# Patient Record
Sex: FEMALE | Race: BLACK OR AFRICAN AMERICAN | NOT HISPANIC OR LATINO | URBAN - METROPOLITAN AREA
[De-identification: names, ages, dates, MRNs, and addresses within clinical notes are randomized per-mention and may not be internally consistent; named-entity substitution may affect disease eponyms.]

---

## 2022-08-11 ENCOUNTER — HOSPITAL ENCOUNTER (INPATIENT)
Facility: HOSPITAL | Age: 57
LOS: 3 days | Discharge: HOME | DRG: 761 | End: 2022-08-15
Attending: OBSTETRICS & GYNECOLOGY | Admitting: OBSTETRICS & GYNECOLOGY
Payer: COMMERCIAL

## 2022-08-11 DIAGNOSIS — N95.0 POST-MENOPAUSAL BLEEDING: Primary | ICD-10-CM

## 2022-08-11 DIAGNOSIS — D21.9 FIBROIDS: ICD-10-CM

## 2022-08-11 DIAGNOSIS — Z01.811 PRE-OP CHEST EXAM: ICD-10-CM

## 2022-08-11 PROBLEM — R50.9 FEVER: Status: ACTIVE | Noted: 2022-08-11

## 2022-08-11 LAB
ALBUMIN SERPL BCP-MCNC: 2.9 G/DL (ref 3.5–5.2)
ALP SERPL-CCNC: 61 U/L (ref 55–135)
ALT SERPL W/O P-5'-P-CCNC: 99 U/L (ref 10–44)
ANION GAP SERPL CALC-SCNC: 9 MMOL/L (ref 8–16)
AST SERPL-CCNC: 94 U/L (ref 10–40)
BACTERIA #/AREA URNS AUTO: ABNORMAL /HPF
BASOPHILS # BLD AUTO: 0.04 K/UL (ref 0–0.2)
BASOPHILS # BLD AUTO: 0.05 K/UL (ref 0–0.2)
BASOPHILS NFR BLD: 0.5 % (ref 0–1.9)
BASOPHILS NFR BLD: 0.7 % (ref 0–1.9)
BILIRUB SERPL-MCNC: 1.1 MG/DL (ref 0.1–1)
BILIRUB UR QL STRIP: NEGATIVE
BUN SERPL-MCNC: 6 MG/DL (ref 6–20)
CALCIUM SERPL-MCNC: 9.2 MG/DL (ref 8.7–10.5)
CANCER AG125 SERPL-ACNC: 117 U/ML (ref 0–30)
CHLORIDE SERPL-SCNC: 105 MMOL/L (ref 95–110)
CLARITY UR REFRACT.AUTO: ABNORMAL
CO2 SERPL-SCNC: 23 MMOL/L (ref 23–29)
COLOR UR AUTO: YELLOW
CREAT SERPL-MCNC: 0.8 MG/DL (ref 0.5–1.4)
DIFFERENTIAL METHOD: ABNORMAL
DIFFERENTIAL METHOD: ABNORMAL
EOSINOPHIL # BLD AUTO: 0.2 K/UL (ref 0–0.5)
EOSINOPHIL # BLD AUTO: 0.3 K/UL (ref 0–0.5)
EOSINOPHIL NFR BLD: 2.3 % (ref 0–8)
EOSINOPHIL NFR BLD: 3.6 % (ref 0–8)
ERYTHROCYTE [DISTWIDTH] IN BLOOD BY AUTOMATED COUNT: 13.6 % (ref 11.5–14.5)
ERYTHROCYTE [DISTWIDTH] IN BLOOD BY AUTOMATED COUNT: 13.8 % (ref 11.5–14.5)
EST. GFR  (NO RACE VARIABLE): >60 ML/MIN/1.73 M^2
GLUCOSE SERPL-MCNC: 95 MG/DL (ref 70–110)
GLUCOSE UR QL STRIP: NEGATIVE
HCT VFR BLD AUTO: 21.4 % (ref 37–48.5)
HCT VFR BLD AUTO: 21.9 % (ref 37–48.5)
HGB BLD-MCNC: 7.1 G/DL (ref 12–16)
HGB BLD-MCNC: 7.2 G/DL (ref 12–16)
HGB UR QL STRIP: ABNORMAL
HYALINE CASTS UR QL AUTO: 0 /LPF
IMM GRANULOCYTES # BLD AUTO: 0.03 K/UL (ref 0–0.04)
IMM GRANULOCYTES # BLD AUTO: 0.07 K/UL (ref 0–0.04)
IMM GRANULOCYTES NFR BLD AUTO: 0.4 % (ref 0–0.5)
IMM GRANULOCYTES NFR BLD AUTO: 1 % (ref 0–0.5)
KETONES UR QL STRIP: ABNORMAL
LEUKOCYTE ESTERASE UR QL STRIP: NEGATIVE
LYMPHOCYTES # BLD AUTO: 0.5 K/UL (ref 1–4.8)
LYMPHOCYTES # BLD AUTO: 0.6 K/UL (ref 1–4.8)
LYMPHOCYTES NFR BLD: 6.6 % (ref 18–48)
LYMPHOCYTES NFR BLD: 7.1 % (ref 18–48)
MCH RBC QN AUTO: 25.9 PG (ref 27–31)
MCH RBC QN AUTO: 26.5 PG (ref 27–31)
MCHC RBC AUTO-ENTMCNC: 32.9 G/DL (ref 32–36)
MCHC RBC AUTO-ENTMCNC: 33.2 G/DL (ref 32–36)
MCV RBC AUTO: 79 FL (ref 82–98)
MCV RBC AUTO: 80 FL (ref 82–98)
MICROSCOPIC COMMENT: ABNORMAL
MONOCYTES # BLD AUTO: 0.5 K/UL (ref 0.3–1)
MONOCYTES # BLD AUTO: 0.6 K/UL (ref 0.3–1)
MONOCYTES NFR BLD: 6.5 % (ref 4–15)
MONOCYTES NFR BLD: 7.3 % (ref 4–15)
NEUTROPHILS # BLD AUTO: 5.9 K/UL (ref 1.8–7.7)
NEUTROPHILS # BLD AUTO: 6.3 K/UL (ref 1.8–7.7)
NEUTROPHILS NFR BLD: 81.6 % (ref 38–73)
NEUTROPHILS NFR BLD: 82.4 % (ref 38–73)
NITRITE UR QL STRIP: NEGATIVE
NRBC BLD-RTO: 0 /100 WBC
NRBC BLD-RTO: 0 /100 WBC
PH UR STRIP: 7 [PH] (ref 5–8)
PLATELET # BLD AUTO: 289 K/UL (ref 150–450)
PLATELET # BLD AUTO: 308 K/UL (ref 150–450)
PMV BLD AUTO: 9.6 FL (ref 9.2–12.9)
PMV BLD AUTO: 9.9 FL (ref 9.2–12.9)
POTASSIUM SERPL-SCNC: 3.9 MMOL/L (ref 3.5–5.1)
PROT SERPL-MCNC: 6.7 G/DL (ref 6–8.4)
PROT UR QL STRIP: ABNORMAL
RBC # BLD AUTO: 2.68 M/UL (ref 4–5.4)
RBC # BLD AUTO: 2.78 M/UL (ref 4–5.4)
RBC #/AREA URNS AUTO: >100 /HPF (ref 0–4)
SODIUM SERPL-SCNC: 137 MMOL/L (ref 136–145)
SP GR UR STRIP: 1.02 (ref 1–1.03)
SQUAMOUS #/AREA URNS AUTO: 3 /HPF
URN SPEC COLLECT METH UR: ABNORMAL
WBC # BLD AUTO: 7.26 K/UL (ref 3.9–12.7)
WBC # BLD AUTO: 7.7 K/UL (ref 3.9–12.7)
WBC #/AREA URNS AUTO: 1 /HPF (ref 0–5)

## 2022-08-11 PROCEDURE — G0379 DIRECT REFER HOSPITAL OBSERV: HCPCS

## 2022-08-11 PROCEDURE — 96375 TX/PRO/DX INJ NEW DRUG ADDON: CPT

## 2022-08-11 PROCEDURE — 36415 COLL VENOUS BLD VENIPUNCTURE: CPT | Performed by: OBSTETRICS & GYNECOLOGY

## 2022-08-11 PROCEDURE — 99219 PR INITIAL OBSERVATION CARE,LEVL II: ICD-10-PCS | Mod: ,,, | Performed by: OBSTETRICS & GYNECOLOGY

## 2022-08-11 PROCEDURE — 87086 URINE CULTURE/COLONY COUNT: CPT | Performed by: STUDENT IN AN ORGANIZED HEALTH CARE EDUCATION/TRAINING PROGRAM

## 2022-08-11 PROCEDURE — 85025 COMPLETE CBC W/AUTO DIFF WBC: CPT | Performed by: OBSTETRICS & GYNECOLOGY

## 2022-08-11 PROCEDURE — 63600175 PHARM REV CODE 636 W HCPCS: Performed by: STUDENT IN AN ORGANIZED HEALTH CARE EDUCATION/TRAINING PROGRAM

## 2022-08-11 PROCEDURE — 96376 TX/PRO/DX INJ SAME DRUG ADON: CPT

## 2022-08-11 PROCEDURE — 86304 IMMUNOASSAY TUMOR CA 125: CPT | Performed by: STUDENT IN AN ORGANIZED HEALTH CARE EDUCATION/TRAINING PROGRAM

## 2022-08-11 PROCEDURE — 87040 BLOOD CULTURE FOR BACTERIA: CPT | Performed by: STUDENT IN AN ORGANIZED HEALTH CARE EDUCATION/TRAINING PROGRAM

## 2022-08-11 PROCEDURE — 99219 PR INITIAL OBSERVATION CARE,LEVL II: CPT | Mod: ,,, | Performed by: OBSTETRICS & GYNECOLOGY

## 2022-08-11 PROCEDURE — 85025 COMPLETE CBC W/AUTO DIFF WBC: CPT | Mod: 91 | Performed by: STUDENT IN AN ORGANIZED HEALTH CARE EDUCATION/TRAINING PROGRAM

## 2022-08-11 PROCEDURE — 25000003 PHARM REV CODE 250: Performed by: STUDENT IN AN ORGANIZED HEALTH CARE EDUCATION/TRAINING PROGRAM

## 2022-08-11 PROCEDURE — 81001 URINALYSIS AUTO W/SCOPE: CPT | Performed by: STUDENT IN AN ORGANIZED HEALTH CARE EDUCATION/TRAINING PROGRAM

## 2022-08-11 PROCEDURE — 36415 COLL VENOUS BLD VENIPUNCTURE: CPT | Performed by: STUDENT IN AN ORGANIZED HEALTH CARE EDUCATION/TRAINING PROGRAM

## 2022-08-11 PROCEDURE — 96361 HYDRATE IV INFUSION ADD-ON: CPT

## 2022-08-11 PROCEDURE — G0378 HOSPITAL OBSERVATION PER HR: HCPCS

## 2022-08-11 PROCEDURE — 80053 COMPREHEN METABOLIC PANEL: CPT | Performed by: OBSTETRICS & GYNECOLOGY

## 2022-08-11 RX ORDER — LANOLIN ALCOHOL/MO/W.PET/CERES
1 CREAM (GRAM) TOPICAL DAILY
Status: DISCONTINUED | OUTPATIENT
Start: 2022-08-11 | End: 2022-08-15 | Stop reason: HOSPADM

## 2022-08-11 RX ORDER — METOCLOPRAMIDE HYDROCHLORIDE 5 MG/ML
10 INJECTION INTRAMUSCULAR; INTRAVENOUS EVERY 6 HOURS PRN
Status: DISCONTINUED | OUTPATIENT
Start: 2022-08-11 | End: 2022-08-15 | Stop reason: HOSPADM

## 2022-08-11 RX ORDER — IBUPROFEN 600 MG/1
600 TABLET ORAL EVERY 6 HOURS
Status: DISCONTINUED | OUTPATIENT
Start: 2022-08-11 | End: 2022-08-11

## 2022-08-11 RX ORDER — KETOROLAC TROMETHAMINE 15 MG/ML
30 INJECTION, SOLUTION INTRAMUSCULAR; INTRAVENOUS EVERY 6 HOURS
Status: DISCONTINUED | OUTPATIENT
Start: 2022-08-11 | End: 2022-08-13

## 2022-08-11 RX ORDER — DIPHENHYDRAMINE HCL 25 MG
25 CAPSULE ORAL EVERY 6 HOURS PRN
Status: DISCONTINUED | OUTPATIENT
Start: 2022-08-11 | End: 2022-08-15 | Stop reason: HOSPADM

## 2022-08-11 RX ORDER — SODIUM CHLORIDE, SODIUM LACTATE, POTASSIUM CHLORIDE, CALCIUM CHLORIDE 600; 310; 30; 20 MG/100ML; MG/100ML; MG/100ML; MG/100ML
INJECTION, SOLUTION INTRAVENOUS CONTINUOUS
Status: DISCONTINUED | OUTPATIENT
Start: 2022-08-11 | End: 2022-08-11

## 2022-08-11 RX ORDER — ONDANSETRON 2 MG/ML
4 INJECTION INTRAMUSCULAR; INTRAVENOUS EVERY 6 HOURS PRN
Status: DISCONTINUED | OUTPATIENT
Start: 2022-08-11 | End: 2022-08-15 | Stop reason: HOSPADM

## 2022-08-11 RX ORDER — SIMETHICONE 80 MG
1 TABLET,CHEWABLE ORAL 3 TIMES DAILY PRN
Status: DISCONTINUED | OUTPATIENT
Start: 2022-08-11 | End: 2022-08-15 | Stop reason: HOSPADM

## 2022-08-11 RX ORDER — POLYETHYLENE GLYCOL 3350 17 G/17G
17 POWDER, FOR SOLUTION ORAL 2 TIMES DAILY PRN
Status: DISCONTINUED | OUTPATIENT
Start: 2022-08-11 | End: 2022-08-15 | Stop reason: HOSPADM

## 2022-08-11 RX ORDER — OXYCODONE HYDROCHLORIDE 10 MG/1
10 TABLET ORAL EVERY 6 HOURS PRN
Status: DISCONTINUED | OUTPATIENT
Start: 2022-08-11 | End: 2022-08-15 | Stop reason: HOSPADM

## 2022-08-11 RX ORDER — MEDROXYPROGESTERONE ACETATE 10 MG/1
10 TABLET ORAL DAILY
Status: DISCONTINUED | OUTPATIENT
Start: 2022-08-11 | End: 2022-08-15 | Stop reason: HOSPADM

## 2022-08-11 RX ORDER — HYDRALAZINE HYDROCHLORIDE 20 MG/ML
10 INJECTION INTRAMUSCULAR; INTRAVENOUS EVERY 6 HOURS PRN
Status: DISCONTINUED | OUTPATIENT
Start: 2022-08-11 | End: 2022-08-15 | Stop reason: HOSPADM

## 2022-08-11 RX ORDER — OXYCODONE HYDROCHLORIDE 5 MG/1
5 TABLET ORAL EVERY 6 HOURS PRN
Status: DISCONTINUED | OUTPATIENT
Start: 2022-08-11 | End: 2022-08-15 | Stop reason: HOSPADM

## 2022-08-11 RX ORDER — AMOXICILLIN 250 MG
1 CAPSULE ORAL 2 TIMES DAILY
Status: DISCONTINUED | OUTPATIENT
Start: 2022-08-11 | End: 2022-08-15 | Stop reason: HOSPADM

## 2022-08-11 RX ORDER — ACETAMINOPHEN 500 MG
1000 TABLET ORAL EVERY 6 HOURS PRN
Status: DISCONTINUED | OUTPATIENT
Start: 2022-08-11 | End: 2022-08-13

## 2022-08-11 RX ORDER — CALCIUM CARBONATE 200(500)MG
500 TABLET,CHEWABLE ORAL 2 TIMES DAILY PRN
Status: DISCONTINUED | OUTPATIENT
Start: 2022-08-11 | End: 2022-08-15 | Stop reason: HOSPADM

## 2022-08-11 RX ORDER — TALC
6 POWDER (GRAM) TOPICAL NIGHTLY PRN
Status: DISCONTINUED | OUTPATIENT
Start: 2022-08-11 | End: 2022-08-15 | Stop reason: HOSPADM

## 2022-08-11 RX ADMIN — FERROUS SULFATE TAB 325 MG (65 MG ELEMENTAL FE) 1 EACH: 325 (65 FE) TAB at 02:08

## 2022-08-11 RX ADMIN — KETOROLAC TROMETHAMINE 30 MG: 15 INJECTION, SOLUTION INTRAMUSCULAR; INTRAVENOUS at 11:08

## 2022-08-11 RX ADMIN — CEFTRIAXONE 1 G: 1 INJECTION, SOLUTION INTRAVENOUS at 09:08

## 2022-08-11 RX ADMIN — KETOROLAC TROMETHAMINE 30 MG: 15 INJECTION, SOLUTION INTRAMUSCULAR; INTRAVENOUS at 05:08

## 2022-08-11 RX ADMIN — SODIUM CHLORIDE, SODIUM LACTATE, POTASSIUM CHLORIDE, AND CALCIUM CHLORIDE: .6; .31; .03; .02 INJECTION, SOLUTION INTRAVENOUS at 10:08

## 2022-08-11 RX ADMIN — MEDROXYPROGESTERONE ACETATE 10 MG: 10 TABLET ORAL at 02:08

## 2022-08-11 RX ADMIN — ACETAMINOPHEN 1000 MG: 500 TABLET ORAL at 08:08

## 2022-08-11 RX ADMIN — ACETAMINOPHEN 1000 MG: 500 TABLET ORAL at 09:08

## 2022-08-11 RX ADMIN — SENNOSIDES AND DOCUSATE SODIUM 1 TABLET: 50; 8.6 TABLET ORAL at 08:08

## 2022-08-11 NOTE — ASSESSMENT & PLAN NOTE
- Isolated 101.4 temperature with remainder VSS. 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

## 2022-08-11 NOTE — HOSPITAL COURSE
08/11/2022 Transfer from OSH in the setting of vaginal bleeding, pelvic pain, and fevers. On arrival, patient febrile to 101.4, afebrile s/p administration of Tylenol. IVF started. CBC/CMP obtained. Patient reports light vaginal spotting. Last BM this AM. Reports mild abdominal cramping but denies severe pain. UA negative nitrites/leuks. Blood and urine culture ordered however s/p antibiotics at OSH. Rocephin x1 administered in the setting of fever. CBC/CMP pending. Plan for supportive care with pain control and IV Abx.   08/12/2022 - AM H/H roughly stable @ 6.9/21. However patient now symptomatic so will transfuse. Patient remains asymptomatic. Continued mild range fevers overnight, Rocephin discontinued & Zosyn initiated. Continue supportive care with pain control & IV Abx.  08/13/2022 AM Hgb improved after 1upRBC @ 7.5, asymptomatic > repeat 1u pRBC. Fever overnight 103.4 at end of blood transfusion, resolved 100.1 with Tylenol. No other signs of infection. Continuing Zoysn, pre treating transfusion with Benadryl and Tylenol. CT AP 20cm fibroid. DC Toradol  > ibuprofen. DC Tylenol PRN, nursing to call if any fevers. CXR and EKG for pre op clearance. PM CBC and BMP. Starting prophylactic Lovenox.   08/14/2022 Hgb 9.7 > 8.6, overall stable. Cr at baseline 1.0 > 0.8. Afebrile x 36H, d/c IV Zosyn and transition PO augmentin. D/c ibuprofen. Will monitor closely for fevers. Discussed possible discharge vs surgery, will f/u with Dr Coffey.   08/15/2022 Afebrile x48H. Receiving PO augmentin and oxycodone 5/10 PRN for pain. Patient to be discharged and scheduled for outpatient WEN.

## 2022-08-11 NOTE — PROGRESS NOTES
[facility] - Telemetry Stepdown  Gynecologic Oncology  Progress Note      Patient Name: Priyanka Raines  MRN: [id number]  Admission Date: 2022  Hospital Length of Stay: 1 days  Attending Provider: Melvin Coffey MD  Primary Care Provider: Primary Doctor No  Principal Problem: <principal problem not specified>    Follow-up For: * No surgery found *  Post-Operative Day:    Subjective:      History of Present Illness:  Priyanka Raines is a 57 y.o.  presents as transfer from H in the setting of vaginal bleeding, pelvic pain, and fevers. Patient had been admitted twice in the setting of vaginal bleeding and was thought to be secondary to uterine fibroids. Patient then developed isolated fevers for which blood and urine cultures were obtained, and she was started on antibiotics (unclear which antibiotic therapy was started). Review of outside records reveals normal daily CBC (no WBC), TVUS showed enlarged uterus measuring 15.6 x 6.7 x 10.9 cm with enlarged midline mass measuring 7.3 x 8.5 cm likely pedunculated fibroid or ovarian dermoid, tumor markers drawn, and endometrial biopsy completed with sound to 10 cm, however results unavailable.     On arrival to [facility] [place], [place], patient had isolated fever 101.4, , BP wnl, 94% on RA. She was started on IV Rocephin, fluid bolus and STAT labs ordered.    On evaluation, patient resting comfortably in bed. Able to converse in complete sentences. She is accompanied by her  and son. She reports mild pelvic cramping and light vaginal spotting. She denies nausea/vomiting, constipation, diarrhea, dysuria, CP, or SOB.    Patient reports long standing history of PMB. She previously had AUB with menstrual cycles and is s/p endometrial ablation greater than 15 years ago. She noticed spotting recently for which she underwent endometrial biopsies x2 which were negative for malignancy per patient report. She was started on Provera with control of bleeding. She has known history  of uterine fibroids. Patient reports this past week she had an episode of bleeding and associated pain which prompted evaluation in the ED.      Ob/Gyn history  ,  x1  H/o of endometrial ablation in the setting of AUB approximately 15 yo  Denies history of STDs  Receives routine Ob[facility]Gyn care at [facility]. Last pap smear one year ago and normal per patient report  Currently sexually active with one male partner  Menopause age 54  Cousin with h/o breast cancer, no first degree relatives with history of breast/Gyn cancer          Hospital Course:  2022 Transfer from OSH in the setting of vaginal bleeding, pelvic pain, and fevers. On arrival, patient febrile to 101.4, afebrile s/p administration of Tylenol. IVF started. CBC/CMP obtained. Patient reports light vaginal spotting. Last BM this AM. Reports mild abdominal cramping but denies severe pain. UA negative nitrites/leuks. Blood and urine culture ordered however s/p antibiotics at OSH. Rocephin x1 administered in the setting of fever. CBC/CMP pending. Plan for supportive care with pain control and IV Abx.             Scheduled Meds:   cefTRIAXone (ROCEPHIN) IVPB  1 g Intravenous Q24H    ketorolac  30 mg Intravenous Q6H     Continuous Infusions:   lactated ringers 125 mL/hr at 22 1032     PRN Meds:acetaminophen    Review of patient's allergies indicates:  Not on File    Objective:     Vital Signs (Most Recent):  Temp: 99.9 °F (37.7 °C) (22 1123)  Pulse: 99 (22 1123)  Resp: 18 (22 1123)  BP: 127/63 (22 1123)  SpO2: 96 % (22 1123) Vital Signs (24h Range):  Temp:  [99.4 °F (37.4 °C)-101.4 °F (38.6 °C)] 99.9 °F (37.7 °C)  Pulse:  [] 99  Resp:  [16-20] 18  SpO2:  [94 %-100 %] 96 %  BP: (127-144)/(61-69) 127/63        There is no height or weight on file to calculate BMI.    Intake/Output - Last 3 Shifts          0700  08/10 0659 08/10 0700  08/11 0659 08/11 0700  08/12 0659           Stool Occurrence   0 x                Physical Exam:   Constitutional: She is oriented to person, place, and time. She appears well-developed. No distress.    HENT:   Head: Normocephalic and atraumatic.     Neck: No thyromegaly present.    Cardiovascular:  Normal rate.             Pulmonary/Chest: Effort normal. No respiratory distress. Right breast exhibits no mass, no nipple discharge, no skin change, no tenderness and no swelling. Left breast exhibits no mass, no nipple discharge, no skin change, no tenderness and no swelling. Breasts are symmetrical.        Abdominal: Soft. She exhibits no distension and no mass. There is no splenomegaly or hepatomegaly. There is no abdominal tenderness. There is no rebound and no guarding. No hernia.     Genitourinary: Cervix is normal. Right adnexum displays no tenderness and no fullness. Left adnexum displays no tenderness and no fullness. Uterus is not enlarged and not tender.    Genitourinary Comments: Deferred, no bleeding on pad                Lymphadenopathy:     She has no cervical adenopathy.    Neurological: She is alert and oriented to person, place, and time.    Skin: Skin is warm and dry.    Psychiatric: She has a normal mood and affect. Her behavior is normal. Mood, affect and thought content normal.     Lines/Drains/Airways       Peripheral Intravenous Line  Duration                  Peripheral IV - Single Lumen 08/11/22 0930 22 G Anterior;Distal;Right Forearm <1 day                    Laboratory:  Recent Lab Results         08/11/22  0956        Appearance, UA Hazy       Bacteria, UA Few       Bilirubin (UA) Negative       Color, UA Yellow       Glucose, UA Negative       Hyaline Casts, UA 0       Ketones, UA 3+       Leukocytes, UA Negative       Microscopic Comment SEE COMMENT  Comment: Other formed elements not mentioned in the report are not   present in the microscopic examination.          NITRITE UA Negative       Occult Blood UA 3+       pH, UA 7.0       Protein, UA 1+  Comment:  Recommend a 24 hour urine protein or a urine   protein/creatinine ratio if globulin induced proteinuria is  clinically suspected.         RBC, UA >100       Specific Gravity, UA 1.025       Specimen UA Urine, Clean Catch       Squam Epithel, UA 3       WBC, UA 1                 Assessment/Plan:     Fever  - Isolated 101.4 temperature with remainder VSS. 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, will continue to look for signs/symptoms of infection       Post-menopausal bleeding  - s/p Endometrial ablation circa [id number]  - Patient reports PMB beginning approximately one year ago with negative endometrial biopsies and controlled with provera  - Patient then presented with acute vaginal bleeding with associated pain and fevers and transferred to [facility] [place]  - Outside records reviewed, TVUS enlarged with 15.6 x 6.7 x 10.9 with multiple myomas and large midline mass measuring 7.3 x 7 x 8.5 cm with small cysitic area representing pedunculated fibroid vs dermoid mass  - Tumor markers obtained at OSH but unavailable,  ordered  - Endometrial biopsy performed at OSH, pathology pending  - Bleeding likely secondary to uterine fibroids, however Embx pending  - Continue home Provera 10 mg  - Consider repeat imaging on admission  - Continue scheduled Toradol and Tylenol PRN for pain        VTE Risk Mitigation (From admission, onward)    None            Katherine C Boecking, MD  Gynecologic Oncology  [facility] - Telemetry Stepdown

## 2022-08-11 NOTE — PLAN OF CARE
Problem: Adult Inpatient Plan of Care  Goal: Plan of Care Review  Outcome: Ongoing, Progressing  Goal: Patient-Specific Goal (Individualized)  Outcome: Ongoing, Progressing  Goal: Optimal Comfort and Wellbeing  Outcome: Ongoing, Progressing  Intervention: Provide Person-Centered Care  Flowsheets (Taken 8/11/2022 6652)  Trust Relationship/Rapport:   care explained   choices provided   emotional support provided   questions answered   questions encouraged   reassurance provided   thoughts/feelings acknowledged   empathic listening provided     POC reviewed. AAOX4. Afebrile. Tylenol,prn for fever earlier. Pain manage with Toradol,IV. No acute changes. Patient waiting to be transfer to Oncology awaiting bed. Free from falls and injuries. Call light in reach. [facility]

## 2022-08-11 NOTE — SUBJECTIVE & OBJECTIVE
Scheduled Meds:   cefTRIAXone (ROCEPHIN) IVPB  1 g Intravenous Q24H    ketorolac  30 mg Intravenous Q6H     Continuous Infusions:   lactated ringers 125 mL/hr at 08/11/22 1032     PRN Meds:acetaminophen    Review of patient's allergies indicates:  Not on File    Objective:     Vital Signs (Most Recent):  Temp: 99.9 °F (37.7 °C) (08/11/22 1123)  Pulse: 99 (08/11/22 1123)  Resp: 18 (08/11/22 1123)  BP: 127/63 (08/11/22 1123)  SpO2: 96 % (08/11/22 1123) Vital Signs (24h Range):  Temp:  [99.4 °F (37.4 °C)-101.4 °F (38.6 °C)] 99.9 °F (37.7 °C)  Pulse:  [] 99  Resp:  [16-20] 18  SpO2:  [94 %-100 %] 96 %  BP: (127-144)/(61-69) 127/63        There is no height or weight on file to calculate BMI.    Intake/Output - Last 3 Shifts         08/09 0700  08/10 0659 08/10 0700  08/11 0659 08/11 0700  08/12 0659           Stool Occurrence   0 x               Physical Exam:   Constitutional: She is oriented to person, place, and time. She appears well-developed. No distress.    HENT:   Head: Normocephalic and atraumatic.     Neck: No thyromegaly present.    Cardiovascular:  Normal rate.             Pulmonary/Chest: Effort normal. No respiratory distress. Right breast exhibits no mass, no nipple discharge, no skin change, no tenderness and no swelling. Left breast exhibits no mass, no nipple discharge, no skin change, no tenderness and no swelling. Breasts are symmetrical.        Abdominal: Soft. She exhibits no distension and no mass. There is no splenomegaly or hepatomegaly. There is no abdominal tenderness. There is no rebound and no guarding. No hernia.     Genitourinary: Cervix is normal. Right adnexum displays no tenderness and no fullness. Left adnexum displays no tenderness and no fullness. Uterus is not enlarged and not tender.    Genitourinary Comments: Deferred, no bleeding on pad                Lymphadenopathy:     She has no cervical adenopathy.    Neurological: She is alert and oriented to person, place, and  time.    Skin: Skin is warm and dry.    Psychiatric: She has a normal mood and affect. Her behavior is normal. Mood, affect and thought content normal.     Lines/Drains/Airways       Peripheral Intravenous Line  Duration                  Peripheral IV - Single Lumen 08/11/22 0930 22 G Anterior;Distal;Right Forearm <1 day                    Laboratory:  Recent Lab Results         08/11/22  0956        Appearance, UA Hazy       Bacteria, UA Few       Bilirubin (UA) Negative       Color, UA Yellow       Glucose, UA Negative       Hyaline Casts, UA 0       Ketones, UA 3+       Leukocytes, UA Negative       Microscopic Comment SEE COMMENT  Comment: Other formed elements not mentioned in the report are not   present in the microscopic examination.          NITRITE UA Negative       Occult Blood UA 3+       pH, UA 7.0       Protein, UA 1+  Comment: Recommend a 24 hour urine protein or a urine   protein/creatinine ratio if globulin induced proteinuria is  clinically suspected.         RBC, UA >100       Specific Gravity, UA 1.025       Specimen UA Urine, Clean Catch       Squam Epithel, UA 3       WBC, UA 1

## 2022-08-11 NOTE — ASSESSMENT & PLAN NOTE
- s/p Endometrial ablation circa [id number]  - Patient reports PMB beginning approximately one year ago with negative endometrial biopsies and controlled with provera  - Patient then presented with acute vaginal bleeding with associated pain and fevers and transferred to [facility] [place]  - Outside records reviewed, TVUS enlarged with 15.6 x 6.7 x 10.9 with multiple myomas and large midline mass measuring 7.3 x 7 x 8.5 cm with small cysitic area representing pedunculated fibroid vs dermoid mass  - Tumor markers obtained at OSH but unavailable,  ordered  - Endometrial biopsy performed at OSH, pathology pending  - Bleeding likely secondary to uterine fibroids, however Embx pending  - Continue home Provera 10 mg  - Consider repeat imaging on admission  - Continue scheduled Toradol and Tylenol PRN for pain

## 2022-08-11 NOTE — NURSING
Received pt via stretcher, AAOX4. See flow sheet.  Patient oriented to room. Call light in reach. Bed in lowest position. Patient complaint of headache. Denies abdominal discomfort@this time.

## 2022-08-11 NOTE — HPI
Priyanka Raines is a 57 y.o.  presents as transfer from OSH in the setting of vaginal bleeding, pelvic pain, and fevers. Patient had been admitted twice in the setting of vaginal bleeding and was thought to be secondary to uterine fibroids. Patient then developed isolated fevers for which blood and urine cultures were obtained, and she was started on antibiotics (unclear which antibiotic therapy was started). Review of outside records reveals normal daily CBC (no WBC), TVUS showed enlarged uterus measuring 15.6 x 6.7 x 10.9 cm with enlarged midline mass measuring 7.3 x 8.5 cm likely pedunculated fibroid or ovarian dermoid, tumor markers drawn, and endometrial biopsy completed with sound to 10 cm, however results unavailable.     On arrival to [facility] [place], patient had isolated fever 101.4, , BP wnl, 94% on RA. She was started on IV Rocephin, fluid bolus and STAT labs ordered.    On evaluation, patient resting comfortably in bed. Able to converse in complete sentences. She is accompanied by her  and son. She reports mild pelvic cramping and light vaginal spotting. She denies nausea/vomiting, constipation, diarrhea, dysuria, CP, or SOB.    Patient reports long standing history of PMB. She previously had AUB with menstrual cycles and is s/p endometrial ablation greater than 15 years ago. She noticed spotting recently for which she underwent endometrial biopsies x2 which were negative for malignancy per patient report. She was started on Provera with control of bleeding. She has known history of uterine fibroids. Patient reports this past week she had an episode of bleeding and associated pain which prompted evaluation in the ED.      Ob/Gyn history  ,  x1  H/o of endometrial ablation in the setting of AUB approximately 15 yo  Denies history of STDs  Receives routine Ob/Gyn care at [facility]. Last pap smear one year ago and normal per patient report  Currently sexually active with one male  partner  Menopause age 54  Cousin with h/o breast cancer, no first degree relatives with history of breast/Gyn cancer

## 2022-08-12 PROBLEM — R74.01 TRANSAMINITIS: Status: ACTIVE | Noted: 2022-08-12

## 2022-08-12 PROBLEM — D21.9 FIBROIDS: Status: ACTIVE | Noted: 2022-08-12

## 2022-08-12 LAB
ABO + RH BLD: NORMAL
BACTERIA UR CULT: NO GROWTH
BASOPHILS # BLD AUTO: 0.03 K/UL (ref 0–0.2)
BASOPHILS NFR BLD: 0.5 % (ref 0–1.9)
BLD GP AB SCN CELLS X3 SERPL QL: NORMAL
BLD PROD TYP BPU: NORMAL
BLOOD UNIT EXPIRATION DATE: NORMAL
BLOOD UNIT TYPE CODE: 5100
BLOOD UNIT TYPE: NORMAL
CODING SYSTEM: NORMAL
DIFFERENTIAL METHOD: ABNORMAL
DISPENSE STATUS: NORMAL
EOSINOPHIL # BLD AUTO: 0.3 K/UL (ref 0–0.5)
EOSINOPHIL NFR BLD: 5.1 % (ref 0–8)
ERYTHROCYTE [DISTWIDTH] IN BLOOD BY AUTOMATED COUNT: 13.8 % (ref 11.5–14.5)
HCT VFR BLD AUTO: 21 % (ref 37–48.5)
HGB BLD-MCNC: 6.9 G/DL (ref 12–16)
IMM GRANULOCYTES # BLD AUTO: 0.04 K/UL (ref 0–0.04)
IMM GRANULOCYTES NFR BLD AUTO: 0.7 % (ref 0–0.5)
LYMPHOCYTES # BLD AUTO: 0.4 K/UL (ref 1–4.8)
LYMPHOCYTES NFR BLD: 7 % (ref 18–48)
MCH RBC QN AUTO: 26.1 PG (ref 27–31)
MCHC RBC AUTO-ENTMCNC: 32.9 G/DL (ref 32–36)
MCV RBC AUTO: 80 FL (ref 82–98)
MONOCYTES # BLD AUTO: 0.5 K/UL (ref 0.3–1)
MONOCYTES NFR BLD: 8.6 % (ref 4–15)
NEUTROPHILS # BLD AUTO: 4.5 K/UL (ref 1.8–7.7)
NEUTROPHILS NFR BLD: 78.1 % (ref 38–73)
NRBC BLD-RTO: 0 /100 WBC
NUM UNITS TRANS PACKED RBC: NORMAL
PLATELET # BLD AUTO: 293 K/UL (ref 150–450)
PMV BLD AUTO: 9.7 FL (ref 9.2–12.9)
RBC # BLD AUTO: 2.64 M/UL (ref 4–5.4)
WBC # BLD AUTO: 5.7 K/UL (ref 3.9–12.7)

## 2022-08-12 PROCEDURE — 36415 COLL VENOUS BLD VENIPUNCTURE: CPT | Performed by: STUDENT IN AN ORGANIZED HEALTH CARE EDUCATION/TRAINING PROGRAM

## 2022-08-12 PROCEDURE — 25000003 PHARM REV CODE 250: Performed by: STUDENT IN AN ORGANIZED HEALTH CARE EDUCATION/TRAINING PROGRAM

## 2022-08-12 PROCEDURE — 80074 ACUTE HEPATITIS PANEL: CPT | Performed by: STUDENT IN AN ORGANIZED HEALTH CARE EDUCATION/TRAINING PROGRAM

## 2022-08-12 PROCEDURE — P9016 RBC LEUKOCYTES REDUCED: HCPCS | Performed by: STUDENT IN AN ORGANIZED HEALTH CARE EDUCATION/TRAINING PROGRAM

## 2022-08-12 PROCEDURE — 36430 TRANSFUSION BLD/BLD COMPNT: CPT

## 2022-08-12 PROCEDURE — 94761 N-INVAS EAR/PLS OXIMETRY MLT: CPT

## 2022-08-12 PROCEDURE — 86850 RBC ANTIBODY SCREEN: CPT | Performed by: STUDENT IN AN ORGANIZED HEALTH CARE EDUCATION/TRAINING PROGRAM

## 2022-08-12 PROCEDURE — 96366 THER/PROPH/DIAG IV INF ADDON: CPT

## 2022-08-12 PROCEDURE — 96365 THER/PROPH/DIAG IV INF INIT: CPT

## 2022-08-12 PROCEDURE — 96376 TX/PRO/DX INJ SAME DRUG ADON: CPT

## 2022-08-12 PROCEDURE — 86920 COMPATIBILITY TEST SPIN: CPT | Performed by: STUDENT IN AN ORGANIZED HEALTH CARE EDUCATION/TRAINING PROGRAM

## 2022-08-12 PROCEDURE — 87040 BLOOD CULTURE FOR BACTERIA: CPT | Performed by: STUDENT IN AN ORGANIZED HEALTH CARE EDUCATION/TRAINING PROGRAM

## 2022-08-12 PROCEDURE — 36415 COLL VENOUS BLD VENIPUNCTURE: CPT | Performed by: OBSTETRICS & GYNECOLOGY

## 2022-08-12 PROCEDURE — 20600001 HC STEP DOWN PRIVATE ROOM

## 2022-08-12 PROCEDURE — 99233 PR SUBSEQUENT HOSPITAL CARE,LEVL III: ICD-10-PCS | Mod: ,,, | Performed by: OBSTETRICS & GYNECOLOGY

## 2022-08-12 PROCEDURE — 85025 COMPLETE CBC W/AUTO DIFF WBC: CPT | Performed by: STUDENT IN AN ORGANIZED HEALTH CARE EDUCATION/TRAINING PROGRAM

## 2022-08-12 PROCEDURE — 96375 TX/PRO/DX INJ NEW DRUG ADDON: CPT

## 2022-08-12 PROCEDURE — 99233 SBSQ HOSP IP/OBS HIGH 50: CPT | Mod: ,,, | Performed by: OBSTETRICS & GYNECOLOGY

## 2022-08-12 PROCEDURE — 63600175 PHARM REV CODE 636 W HCPCS: Performed by: STUDENT IN AN ORGANIZED HEALTH CARE EDUCATION/TRAINING PROGRAM

## 2022-08-12 PROCEDURE — 86920 COMPATIBILITY TEST SPIN: CPT

## 2022-08-12 PROCEDURE — 25000003 PHARM REV CODE 250

## 2022-08-12 RX ORDER — DIPHENHYDRAMINE HCL 25 MG
25 CAPSULE ORAL ONCE
Status: COMPLETED | OUTPATIENT
Start: 2022-08-12 | End: 2022-08-12

## 2022-08-12 RX ORDER — SODIUM CHLORIDE 0.9 % (FLUSH) 0.9 %
10 SYRINGE (ML) INJECTION
Status: DISCONTINUED | OUTPATIENT
Start: 2022-08-12 | End: 2022-08-15 | Stop reason: HOSPADM

## 2022-08-12 RX ORDER — HYDROCODONE BITARTRATE AND ACETAMINOPHEN 500; 5 MG/1; MG/1
TABLET ORAL
Status: DISCONTINUED | OUTPATIENT
Start: 2022-08-12 | End: 2022-08-15 | Stop reason: HOSPADM

## 2022-08-12 RX ADMIN — SENNOSIDES AND DOCUSATE SODIUM 1 TABLET: 50; 8.6 TABLET ORAL at 09:08

## 2022-08-12 RX ADMIN — PIPERACILLIN SODIUM AND TAZOBACTAM SODIUM 4.5 G: 4; .5 INJECTION, POWDER, LYOPHILIZED, FOR SOLUTION INTRAVENOUS at 03:08

## 2022-08-12 RX ADMIN — ACETAMINOPHEN 1000 MG: 500 TABLET ORAL at 06:08

## 2022-08-12 RX ADMIN — KETOROLAC TROMETHAMINE 30 MG: 15 INJECTION, SOLUTION INTRAMUSCULAR; INTRAVENOUS at 06:08

## 2022-08-12 RX ADMIN — DIPHENHYDRAMINE HYDROCHLORIDE 25 MG: 25 CAPSULE ORAL at 09:08

## 2022-08-12 RX ADMIN — FERROUS SULFATE TAB 325 MG (65 MG ELEMENTAL FE) 1 EACH: 325 (65 FE) TAB at 09:08

## 2022-08-12 RX ADMIN — MEDROXYPROGESTERONE ACETATE 10 MG: 10 TABLET ORAL at 09:08

## 2022-08-12 RX ADMIN — KETOROLAC TROMETHAMINE 30 MG: 15 INJECTION, SOLUTION INTRAMUSCULAR; INTRAVENOUS at 11:08

## 2022-08-12 RX ADMIN — PIPERACILLIN SODIUM AND TAZOBACTAM SODIUM 4.5 G: 4; .5 INJECTION, POWDER, LYOPHILIZED, FOR SOLUTION INTRAVENOUS at 06:08

## 2022-08-12 RX ADMIN — ONDANSETRON 4 MG: 2 INJECTION INTRAMUSCULAR; INTRAVENOUS at 12:08

## 2022-08-12 NOTE — PLAN OF CARE
Problem: Adult Inpatient Plan of Care  Goal: Plan of Care Review  8/12/2022 0531 by Courtney Fatima RN  Outcome: Ongoing, Progressing  8/12/2022 0512 by Courtney Fatima RN  Outcome: Ongoing, Progressing  Goal: Patient-Specific Goal (Individualized)  8/12/2022 0531 by Courtney Fatima RN  Outcome: Ongoing, Progressing  8/12/2022 0512 by Courtney Fatima RN  Outcome: Ongoing, Progressing  Goal: Absence of Hospital-Acquired Illness or Injury  8/12/2022 0531 by Courtney Fatima RN  Outcome: Ongoing, Progressing  8/12/2022 0512 by Courtney Fatima RN  Outcome: Ongoing, Progressing  Goal: Optimal Comfort and Wellbeing  8/12/2022 0531 by Courtney Fatima RN  Outcome: Ongoing, Progressing  8/12/2022 0512 by Courtney Fatima RN  Outcome: Ongoing, Progressing  Goal: Readiness for Transition of Care  8/12/2022 0531 by Courtney Fatima RN  Outcome: Ongoing, Progressing  8/12/2022 0512 by Courtney Fatima RN  Outcome: Ongoing, Progressing

## 2022-08-12 NOTE — SUBJECTIVE & OBJECTIVE
Interval History: Continued fevers overnight. Patient feels much improved, however. States she had the best sleep she has had in days. However, does complain of dizziness & lightheadedness with ambulation - causing her to have to sit down. No heavy bleeding overnight - occasional spotting at most.     Scheduled Meds:   ferrous sulfate  1 tablet Oral Daily    ketorolac  30 mg Intravenous Q6H    medroxyPROGESTERone  10 mg Oral Daily    piperacillin-tazobactam (ZOSYN) IVPB  4.5 g Intravenous Q8H    senna-docusate 8.6-50 mg  1 tablet Oral BID     Continuous Infusions:  PRN Meds:acetaminophen, calcium carbonate, diphenhydrAMINE, hydrALAZINE, melatonin, metoclopramide HCl, ondansetron, oxyCODONE, oxyCODONE, polyethylene glycol, simethicone    Review of patient's allergies indicates:  Not on File    Objective:     Vital Signs (Most Recent):  Temp: 100.3 °F (37.9 °C) (08/12/22 0619)  Pulse: 103 (08/12/22 0408)  Resp: 18 (08/12/22 0408)  BP: (!) 119/51 (08/12/22 0408)  SpO2: 99 % (08/12/22 0408)   Vital Signs (24h Range):  Temp:  [99.7 °F (37.6 °C)-101.4 °F (38.6 °C)] 100.3 °F (37.9 °C)  Pulse:  [] 103  Resp:  [16-20] 18  SpO2:  [94 %-100 %] 99 %  BP: (119-144)/(51-66) 119/51     Weight: 90.5 kg (199 lb 8.3 oz)  Body mass index is 35.34 kg/m².    Intake/Output - Last 3 Shifts         08/10 0700  08/11 0659 08/11 0700  08/12 0659 08/12 0700 08/13 0659    P.O.  960     Total Intake(mL/kg)  960 (10.6)     Urine (mL/kg/hr)  100     Stool  0     Total Output  100     Net  +860            Stool Occurrence  0 x                Physical Exam:   Constitutional: She appears well-developed and well-nourished.    HENT:   Head: Atraumatic.    Eyes: EOM are normal.     Cardiovascular:  Normal rate.             Pulmonary/Chest: Effort normal.        Abdominal: Soft. There is no abdominal tenderness.             Musculoskeletal: Normal range of motion.       Neurological: She is alert.    Skin: Skin is warm.    Psychiatric: She has a  normal mood and affect.     Lines/Drains/Airways       Peripheral Intravenous Line  Duration                  Peripheral IV - Single Lumen 08/11/22 0930 22 G Anterior;Distal;Right Forearm <1 day                    Laboratory:  Recent Labs   Lab 08/11/22  1238 08/11/22  1938 08/12/22  0318   WBC 7.70 7.26 5.70   HGB 7.1* 7.2* 6.9*   HCT 21.4* 21.9* 21.0*   MCV 80* 79* 80*    308 293

## 2022-08-12 NOTE — ASSESSMENT & PLAN NOTE
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

## 2022-08-12 NOTE — PROGRESS NOTES
[facility] - Telemetry Stepdown  Gynecologic Oncology  Progress Note      Patient Name: Priyanka Raines  MRN: [id number]  Admission Date: 2022  Hospital Length of Stay: 1 days  Attending Provider: Melvin Coffey MD  Primary Care Provider: Primary Doctor No  Principal Problem: Post-menopausal bleeding    Follow-up For: * No surgery found *  Post-Operative Day:    Subjective:      History of Present Illness:  Priyanka Raines is a 57 y.o.  presents as transfer from OSH in the setting of vaginal bleeding, pelvic pain, and fevers. Patient had been admitted twice in the setting of vaginal bleeding and was thought to be secondary to uterine fibroids. Patient then developed isolated fevers for which blood and urine cultures were obtained, and she was started on antibiotics (unclear which antibiotic therapy was started). Review of outside records reveals normal daily CBC (no WBC), TVUS showed enlarged uterus measuring 15.6 x 6.7 x 10.9 cm with enlarged midline mass measuring 7.3 x 8.5 cm likely pedunculated fibroid or ovarian dermoid, tumor markers drawn, and endometrial biopsy completed with sound to 10 cm, however results unavailable.     On arrival to [facility], patient had isolated fever 101.4, , BP wnl, 94% on RA. She was started on IV Rocephin, fluid bolus and STAT labs ordered.    On evaluation, patient resting comfortably in bed. Able to converse in complete sentences. She is accompanied by her  and son. She reports mild pelvic cramping and light vaginal spotting. She denies nausea/vomiting, constipation, diarrhea, dysuria, CP, or SOB.    Patient reports long standing history of PMB. She previously had AUB with menstrual cycles and is s/p endometrial ablation greater than 15 years ago. She noticed spotting recently for which she underwent endometrial biopsies x2 which were negative for malignancy per patient report. She was started on Provera with control of bleeding. She has known history of uterine  fibroids. Patient reports this past week she had an episode of bleeding and associated pain which prompted evaluation in the ED.      Ob/Gyn history  ,  x1  H/o of endometrial ablation in the setting of AUB approximately 15 yo  Denies history of STDs  Receives routine Ob/Gyn care at [facility]. Last pap smear one year ago and normal per patient report  Currently sexually active with one male partner  Menopause age 54  Cousin with h/o breast cancer, no first degree relatives with history of breast/Gyn cancer          Hospital Course:  2022 Transfer from OSH in the setting of vaginal bleeding, pelvic pain, and fevers. On arrival, patient febrile to 101.4, afebrile s/p administration of Tylenol. IVF started. CBC/CMP obtained. Patient reports light vaginal spotting. Last BM this AM. Reports mild abdominal cramping but denies severe pain. UA negative nitrites/leuks. Blood and urine culture ordered however s/p antibiotics at OSH. Rocephin x1 administered in the setting of fever. CBC/CMP pending. Plan for supportive care with pain control and IV Abx.   2022 - AM H/H roughly stable @ 6.9/21. However patient now symptomatic so will transfuse. Patient remains asymptomatic. Continued mild range fevers overnight, Rocephin discontinued & Zosyn initiated. Continue supportive care with pain control & IV Abx.      Interval History: Continued fevers overnight. Patient feels much improved, however. States she had the best sleep she has had in days. However, does complain of dizziness & lightheadedness with ambulation - causing her to have to sit down. No heavy bleeding overnight - occasional spotting at most.     Scheduled Meds:   ferrous sulfate  1 tablet Oral Daily    ketorolac  30 mg Intravenous Q6H    medroxyPROGESTERone  10 mg Oral Daily    piperacillin-tazobactam (ZOSYN) IVPB  4.5 g Intravenous Q8H    senna-docusate 8.6-50 mg  1 tablet Oral BID     Continuous Infusions:  PRN Meds:acetaminophen, calcium  carbonate, diphenhydrAMINE, hydrALAZINE, melatonin, metoclopramide HCl, ondansetron, oxyCODONE, oxyCODONE, polyethylene glycol, simethicone    Review of patient's allergies indicates:  Not on File    Objective:     Vital Signs (Most Recent):  Temp: 100.3 °F (37.9 °C) (08/12/22 0619)  Pulse: 103 (08/12/22 0408)  Resp: 18 (08/12/22 0408)  BP: (!) 119/51 (08/12/22 0408)  SpO2: 99 % (08/12/22 0408)   Vital Signs (24h Range):  Temp:  [99.7 °F (37.6 °C)-101.4 °F (38.6 °C)] 100.3 °F (37.9 °C)  Pulse:  [] 103  Resp:  [16-20] 18  SpO2:  [94 %-100 %] 99 %  BP: (119-144)/(51-66) 119/51     Weight: 90.5 kg (199 lb 8.3 oz)  Body mass index is 35.34 kg/m².    Intake/Output - Last 3 Shifts         08/10 0700  08/11 0659 08/11 0700  08/12 0659 08/12 0700  08/13 0659    P.O.  960     Total Intake(mL/kg)  960 (10.6)     Urine (mL/kg/hr)  100     Stool  0     Total Output  100     Net  +860            Stool Occurrence  0 x                Physical Exam:   Constitutional: She appears well-developed and well-nourished.    HENT:   Head: Atraumatic.    Eyes: EOM are normal.     Cardiovascular:  Normal rate.             Pulmonary/Chest: Effort normal.        Abdominal: Soft. There is no abdominal tenderness.             Musculoskeletal: Normal range of motion.       Neurological: She is alert.    Skin: Skin is warm.    Psychiatric: She has a normal mood and affect.     Lines/Drains/Airways       Peripheral Intravenous Line  Duration                  Peripheral IV - Single Lumen 08/11/22 0930 22 G Anterior;Distal;Right Forearm <1 day                    Laboratory:  Recent Labs   Lab 08/11/22  1238 08/11/22  1938 08/12/22  0318   WBC 7.70 7.26 5.70   HGB 7.1* 7.2* 6.9*   HCT 21.4* 21.9* 21.0*   MCV 80* 79* 80*    308 293          Assessment/Plan:     * Post-menopausal bleeding  - s/p Endometrial ablation circa 5217-9924  - Patient reports PMB beginning approximately one year ago with negative endometrial biopsies and controlled  with provera  - Patient then presented with acute vaginal bleeding with associated pain and fevers and transferred to [facility]  - TVUS consistent with large fibroid uterus, including possible fundal/pedunculated fibroid  - Tumor markers obtained at OSH but unavailable,  here elevated  - Endometrial biopsy performed at OSH, pathology pending  - Bleeding likely secondary to uterine fibroids, however Embx pending  - Continue home Provera 10 mg  - Continue scheduled Toradol and Tylenol PRN for pain  - Bleeding improved, H/H stable at 6.9/21; patient now with symptoms of anemia. Will give blood transfusion.     Fibroids  - Multiple fibroids on TVUS, hard to characterize  - Concern for degeneration as main reason for pain, fevers  - Plan for MRI imaging    Transaminitis  - AST/ALT 99/94  - Likely acute phase reactant  - Hep panel pending    Fever  - Isolated 101.4 temperature initially on admission, now with recurrent low grade fevers overnight of 100.4F  - Will d/c Rocephin & initiate Zosyn in setting of continued fevers  - Tylenol currently ordered PRN for pain, nursing communication placed this AM to please call/page to notify if fevers present prior to giving Tylenol  - UA negative, blood/urine cultures pending however obtained s/p antibiotic treatment at OSH  - Denies vaginal discharge, concern for PID low  - 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 No: N/A    Kelly Douglass MD  Gynecologic Oncology  [facility] - Telemetry Stepdown

## 2022-08-12 NOTE — CARE UPDATE
MD to bedside for PM eval. Patient laying comfortably in bed, blood transfusion actively running. Endorses intermittent chills however she has had these since entering menopause unchanged from baseline. Denies any pain. No vaginal bleeding today, last was yesterday when she was wiping.     Temp:  [98 °F (36.7 °C)-100.4 °F (38 °C)] 99.4 °F (37.4 °C)  Pulse:  [] 90  Resp:  [16-19] 18  SpO2:  [93 %-100 %] 93 %  BP: (105-130)/(51-66) 126/58    Recent Labs   Lab 08/11/22  1238 08/11/22  1938 08/12/22  0318   WBC 7.70 7.26 5.70   HGB 7.1* 7.2* 6.9*   HCT 21.4* 21.9* 21.0*   MCV 80* 79* 80*    308 293      Will f/u on Hgb post transfusion. Patient does not look infected overall, continuing Zofran until afebrile [id number]. Discussed with patient possibility of recommending hysterectomy if starts to show signs of sepsis. Patient aware, all questions answered. Notified surgery desk and anesthesia teams of possible hysterectomy.     Elaina Narvaez MD  PGY 2  Obstetrics and Gynecology

## 2022-08-12 NOTE — NURSING
RN attempted to contact more than 5 times the phlebotomist in charge of [facility] to inform her that the blood bank needed the ABO/pink top specimen. However I contacted the supervisor of the lab to notify the matter. Will continue to monitor.

## 2022-08-12 NOTE — ASSESSMENT & PLAN NOTE
- s/p Endometrial ablation circa [id number]  - Patient reports PMB beginning approximately one year ago with negative endometrial biopsies and controlled with provera  - Patient then presented with acute vaginal bleeding with associated pain and fevers and transferred to [facility] [place]  - TVUS consistent with large fibroid uterus, including possible fundal/pedunculated fibroid  - Tumor markers obtained at OSH but unavailable,  here elevated  - Endometrial biopsy performed at OSH, pathology pending  - Bleeding likely secondary to uterine fibroids, however Embx pending  - Continue home Provera 10 mg  - Continue scheduled Toradol and Tylenol PRN for pain  - Bleeding improved, H/H stable at 6.9/21; patient now with symptoms of anemia. Will give blood transfusion.

## 2022-08-12 NOTE — ASSESSMENT & PLAN NOTE
- Multiple fibroids on TVUS, hard to characterize  - Concern for degeneration as main reason for pain, fevers  - Plan for MRI imaging

## 2022-08-12 NOTE — PLAN OF CARE
Problem: Adult Inpatient Plan of Care  Goal: Plan of Care Review  Outcome: Ongoing, Progressing     Problem: Adult Inpatient Plan of Care  Goal: Absence of Hospital-Acquired Illness or Injury  Outcome: Ongoing, Progressing     Problem: Adult Inpatient Plan of Care  Goal: Readiness for Transition of Care  Outcome: Ongoing, Progressing     Patient is AAOX4, VSS, NAD. 1 unit of RBC infusing. No s/s of reactions noted. Plan of care reviewed and explained. Patient and family verbalized understanding. Bed in low and locked position, side rails up x 2, call light within reach.

## 2022-08-12 NOTE — H&P
Please see progress note date 8/11 for full H&P.      In short, 58 yo female transfer with vaginal bleeding, pelvic pain, and fevers. 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      Katherine Boecking MD   Ob/Gyn PGY-3

## 2022-08-13 LAB
ANION GAP SERPL CALC-SCNC: 11 MMOL/L (ref 8–16)
BASOPHILS # BLD AUTO: 0.02 K/UL (ref 0–0.2)
BASOPHILS NFR BLD: 0.3 % (ref 0–1.9)
BLD PROD TYP BPU: NORMAL
BLOOD UNIT EXPIRATION DATE: NORMAL
BLOOD UNIT TYPE CODE: 8400
BLOOD UNIT TYPE: NORMAL
BUN SERPL-MCNC: 10 MG/DL (ref 6–20)
CALCIUM SERPL-MCNC: 9.6 MG/DL (ref 8.7–10.5)
CHLORIDE SERPL-SCNC: 103 MMOL/L (ref 95–110)
CO2 SERPL-SCNC: 24 MMOL/L (ref 23–29)
CODING SYSTEM: NORMAL
CREAT SERPL-MCNC: 1 MG/DL (ref 0.5–1.4)
CREAT SERPL-MCNC: 1 MG/DL (ref 0.5–1.4)
DIFFERENTIAL METHOD: ABNORMAL
DISPENSE STATUS: NORMAL
EOSINOPHIL # BLD AUTO: 0.5 K/UL (ref 0–0.5)
EOSINOPHIL NFR BLD: 6.1 % (ref 0–8)
ERYTHROCYTE [DISTWIDTH] IN BLOOD BY AUTOMATED COUNT: 14 % (ref 11.5–14.5)
EST. GFR  (NO RACE VARIABLE): >60 ML/MIN/1.73 M^2
EST. GFR  (NO RACE VARIABLE): >60 ML/MIN/1.73 M^2
GLUCOSE SERPL-MCNC: 112 MG/DL (ref 70–110)
HCT VFR BLD AUTO: 29.4 % (ref 37–48.5)
HGB BLD-MCNC: 7.5 G/DL (ref 12–16)
HGB BLD-MCNC: 9.7 G/DL (ref 12–16)
IMM GRANULOCYTES # BLD AUTO: 0.07 K/UL (ref 0–0.04)
IMM GRANULOCYTES NFR BLD AUTO: 0.9 % (ref 0–0.5)
LYMPHOCYTES # BLD AUTO: 1.1 K/UL (ref 1–4.8)
LYMPHOCYTES NFR BLD: 14.6 % (ref 18–48)
MCH RBC QN AUTO: 28 PG (ref 27–31)
MCHC RBC AUTO-ENTMCNC: 33 G/DL (ref 32–36)
MCV RBC AUTO: 85 FL (ref 82–98)
MONOCYTES # BLD AUTO: 0.5 K/UL (ref 0.3–1)
MONOCYTES NFR BLD: 6.5 % (ref 4–15)
NEUTROPHILS # BLD AUTO: 5.4 K/UL (ref 1.8–7.7)
NEUTROPHILS NFR BLD: 71.6 % (ref 38–73)
NRBC BLD-RTO: 0 /100 WBC
PLATELET # BLD AUTO: 362 K/UL (ref 150–450)
PMV BLD AUTO: 9.3 FL (ref 9.2–12.9)
POTASSIUM SERPL-SCNC: 3.8 MMOL/L (ref 3.5–5.1)
RBC # BLD AUTO: 3.46 M/UL (ref 4–5.4)
SODIUM SERPL-SCNC: 138 MMOL/L (ref 136–145)
TRANS ERYTHROCYTES VOL PATIENT: NORMAL ML
WBC # BLD AUTO: 7.53 K/UL (ref 3.9–12.7)

## 2022-08-13 PROCEDURE — 99233 PR SUBSEQUENT HOSPITAL CARE,LEVL III: ICD-10-PCS | Mod: ,,, | Performed by: OBSTETRICS & GYNECOLOGY

## 2022-08-13 PROCEDURE — 63600175 PHARM REV CODE 636 W HCPCS: Performed by: STUDENT IN AN ORGANIZED HEALTH CARE EDUCATION/TRAINING PROGRAM

## 2022-08-13 PROCEDURE — 36415 COLL VENOUS BLD VENIPUNCTURE: CPT

## 2022-08-13 PROCEDURE — P9021 RED BLOOD CELLS UNIT: HCPCS

## 2022-08-13 PROCEDURE — 93010 ELECTROCARDIOGRAM REPORT: CPT | Mod: ,,, | Performed by: INTERNAL MEDICINE

## 2022-08-13 PROCEDURE — 25000003 PHARM REV CODE 250: Performed by: STUDENT IN AN ORGANIZED HEALTH CARE EDUCATION/TRAINING PROGRAM

## 2022-08-13 PROCEDURE — 25500020 PHARM REV CODE 255: Performed by: OBSTETRICS & GYNECOLOGY

## 2022-08-13 PROCEDURE — 82565 ASSAY OF CREATININE: CPT

## 2022-08-13 PROCEDURE — 80048 BASIC METABOLIC PNL TOTAL CA: CPT

## 2022-08-13 PROCEDURE — 36430 TRANSFUSION BLD/BLD COMPNT: CPT

## 2022-08-13 PROCEDURE — 25000003 PHARM REV CODE 250

## 2022-08-13 PROCEDURE — 20600001 HC STEP DOWN PRIVATE ROOM

## 2022-08-13 PROCEDURE — 85025 COMPLETE CBC W/AUTO DIFF WBC: CPT

## 2022-08-13 PROCEDURE — 99233 SBSQ HOSP IP/OBS HIGH 50: CPT | Mod: ,,, | Performed by: OBSTETRICS & GYNECOLOGY

## 2022-08-13 PROCEDURE — 85018 HEMOGLOBIN: CPT

## 2022-08-13 PROCEDURE — 93010 EKG 12-LEAD: ICD-10-PCS | Mod: ,,, | Performed by: INTERNAL MEDICINE

## 2022-08-13 PROCEDURE — 63600175 PHARM REV CODE 636 W HCPCS

## 2022-08-13 PROCEDURE — 93005 ELECTROCARDIOGRAM TRACING: CPT

## 2022-08-13 RX ORDER — HYDROCODONE BITARTRATE AND ACETAMINOPHEN 500; 5 MG/1; MG/1
TABLET ORAL
Status: DISCONTINUED | OUTPATIENT
Start: 2022-08-13 | End: 2022-08-15 | Stop reason: HOSPADM

## 2022-08-13 RX ORDER — ENOXAPARIN SODIUM 100 MG/ML
40 INJECTION SUBCUTANEOUS EVERY 24 HOURS
Status: DISCONTINUED | OUTPATIENT
Start: 2022-08-13 | End: 2022-08-14

## 2022-08-13 RX ORDER — DIPHENHYDRAMINE HCL 25 MG
25 CAPSULE ORAL EVERY 6 HOURS PRN
Status: DISCONTINUED | OUTPATIENT
Start: 2022-08-13 | End: 2022-08-13

## 2022-08-13 RX ORDER — ACETAMINOPHEN 500 MG
1000 TABLET ORAL ONCE
Status: COMPLETED | OUTPATIENT
Start: 2022-08-13 | End: 2022-08-13

## 2022-08-13 RX ORDER — IBUPROFEN 600 MG/1
600 TABLET ORAL EVERY 6 HOURS
Status: DISCONTINUED | OUTPATIENT
Start: 2022-08-13 | End: 2022-08-14

## 2022-08-13 RX ORDER — SODIUM CHLORIDE, SODIUM LACTATE, POTASSIUM CHLORIDE, CALCIUM CHLORIDE 600; 310; 30; 20 MG/100ML; MG/100ML; MG/100ML; MG/100ML
INJECTION, SOLUTION INTRAVENOUS CONTINUOUS
Status: DISCONTINUED | OUTPATIENT
Start: 2022-08-13 | End: 2022-08-14

## 2022-08-13 RX ORDER — HYDROCORTISONE 1 %
CREAM (GRAM) TOPICAL 2 TIMES DAILY
Status: DISCONTINUED | OUTPATIENT
Start: 2022-08-13 | End: 2022-08-15 | Stop reason: HOSPADM

## 2022-08-13 RX ADMIN — MEDROXYPROGESTERONE ACETATE 10 MG: 10 TABLET ORAL at 09:08

## 2022-08-13 RX ADMIN — SENNOSIDES AND DOCUSATE SODIUM 1 TABLET: 50; 8.6 TABLET ORAL at 09:08

## 2022-08-13 RX ADMIN — SODIUM CHLORIDE, SODIUM LACTATE, POTASSIUM CHLORIDE, AND CALCIUM CHLORIDE: .6; .31; .03; .02 INJECTION, SOLUTION INTRAVENOUS at 12:08

## 2022-08-13 RX ADMIN — KETOROLAC TROMETHAMINE 30 MG: 15 INJECTION, SOLUTION INTRAMUSCULAR; INTRAVENOUS at 05:08

## 2022-08-13 RX ADMIN — ACETAMINOPHEN 1000 MG: 500 TABLET ORAL at 11:08

## 2022-08-13 RX ADMIN — KETOROLAC TROMETHAMINE 30 MG: 15 INJECTION, SOLUTION INTRAMUSCULAR; INTRAVENOUS at 12:08

## 2022-08-13 RX ADMIN — PIPERACILLIN SODIUM AND TAZOBACTAM SODIUM 4.5 G: 4; .5 INJECTION, POWDER, LYOPHILIZED, FOR SOLUTION INTRAVENOUS at 04:08

## 2022-08-13 RX ADMIN — HYDROCORTISONE: 10 CREAM TOPICAL at 09:08

## 2022-08-13 RX ADMIN — DIPHENHYDRAMINE HYDROCHLORIDE 25 MG: 25 CAPSULE ORAL at 10:08

## 2022-08-13 RX ADMIN — FERROUS SULFATE TAB 325 MG (65 MG ELEMENTAL FE) 1 EACH: 325 (65 FE) TAB at 09:08

## 2022-08-13 RX ADMIN — PIPERACILLIN SODIUM AND TAZOBACTAM SODIUM 4.5 G: 4; .5 INJECTION, POWDER, LYOPHILIZED, FOR SOLUTION INTRAVENOUS at 07:08

## 2022-08-13 RX ADMIN — PIPERACILLIN SODIUM AND TAZOBACTAM SODIUM 4.5 G: 4; .5 INJECTION, POWDER, LYOPHILIZED, FOR SOLUTION INTRAVENOUS at 12:08

## 2022-08-13 RX ADMIN — DIPHENHYDRAMINE HYDROCHLORIDE 25 MG: 25 CAPSULE ORAL at 09:08

## 2022-08-13 RX ADMIN — ENOXAPARIN SODIUM 40 MG: 100 INJECTION SUBCUTANEOUS at 08:08

## 2022-08-13 RX ADMIN — IOHEXOL 100 ML: 350 INJECTION, SOLUTION INTRAVENOUS at 07:08

## 2022-08-13 RX ADMIN — PIPERACILLIN SODIUM AND TAZOBACTAM SODIUM 4.5 G: 4; .5 INJECTION, POWDER, LYOPHILIZED, FOR SOLUTION INTRAVENOUS at 11:08

## 2022-08-13 RX ADMIN — SENNOSIDES AND DOCUSATE SODIUM 1 TABLET: 50; 8.6 TABLET ORAL at 08:08

## 2022-08-13 NOTE — ASSESSMENT & PLAN NOTE
- Isolated 101.4 temperature initially on admission, now with recurrent low grade fevers overnight of 100.4F, overnight fever 103.4 @ 1830 8/12 resolved to 100.1 with Tylenol   - Will d/c Rocephin & initiate Zosyn in setting of continued fevers  - Tylenol currently ordered PRN for pain, nursing communication placed this AM to please call/page to notify if fevers present prior to giving Tylenol  - UA negative, urine culture no growth, blood cultures #1 NGTD however obtained s/p antibiotic treatment at OSH; blood cultures #2 after Zosyn initiated NGTD   - Denies vaginal discharge, concern for PID low  - 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

## 2022-08-13 NOTE — CARE UPDATE
RAPID RESPONSE NURSE ROUND       Rounding completed with charge RNLorena. No concerns verbalized at this time. Instructed to call [phone] for further concerns or assistance.

## 2022-08-13 NOTE — PLAN OF CARE
Problem: Adult Inpatient Plan of Care  Goal: Plan of Care Review  Outcome: Ongoing, Progressing  Goal: Patient-Specific Goal (Individualized)  Outcome: Ongoing, Progressing  Goal: Optimal Comfort and Wellbeing  Outcome: Ongoing, Progressing     POC reviewed. AAOX4. VVS. Afebrile. Address questions and concerns. Tolerated blood transfusion well. Remain on strict I&O and IV Abx. IV Fluids of LR infusing. No complaint of discomfort/pain. Call light in reach. Safety checks performed. WCTM

## 2022-08-13 NOTE — PROGRESS NOTES
[facility] - Telemetry Stepdown  Gynecologic Oncology  Progress Note      Patient Name: Priyanka Raines  MRN: [id number]  Admission Date: 2022  Hospital Length of Stay: 2 days  Attending Provider: Melvin Coffey MD  Primary Care Provider: Primary Doctor No  Principal Problem: Post-menopausal bleeding    Follow-up For: * No surgery found *  Post-Operative Day:    Subjective:      History of Present Illness:  Priyanka Raines is a 57 y.o.  presents as transfer from OSH in the setting of vaginal bleeding, pelvic pain, and fevers. Patient had been admitted twice in the setting of vaginal bleeding and was thought to be secondary to uterine fibroids. Patient then developed isolated fevers for which blood and urine cultures were obtained, and she was started on antibiotics (unclear which antibiotic therapy was started). Review of outside records reveals normal daily CBC (no WBC), TVUS showed enlarged uterus measuring 15.6 x 6.7 x 10.9 cm with enlarged midline mass measuring 7.3 x 8.5 cm likely pedunculated fibroid or ovarian dermoid, tumor markers drawn, and endometrial biopsy completed with sound to 10 cm, however results unavailable.     On arrival to [facility] [place], patient had isolated fever 101.4, , BP wnl, 94% on RA. She was started on IV Rocephin, fluid bolus and STAT labs ordered.    On evaluation, patient resting comfortably in bed. Able to converse in complete sentences. She is accompanied by her  and son. She reports mild pelvic cramping and light vaginal spotting. She denies nausea/vomiting, constipation, diarrhea, dysuria, CP, or SOB.    Patient reports long standing history of PMB. She previously had AUB with menstrual cycles and is s/p endometrial ablation greater than 15 years ago. She noticed spotting recently for which she underwent endometrial biopsies x2 which were negative for malignancy per patient report. She was started on Provera with control of bleeding. She has known history of uterine  fibroids. Patient reports this past week she had an episode of bleeding and associated pain which prompted evaluation in the ED.      Ob/Gyn history  ,  x1  H/o of endometrial ablation in the setting of AUB approximately 15 yo  Denies history of STDs  Receives routine Ob/Gyn care at [facility]. Last pap smear one year ago and normal per patient report  Currently sexually active with one male partner  Menopause age 54  Cousin with h/o breast cancer, no first degree relatives with history of breast/Gyn cancer          Hospital Course:  2022 Transfer from OSH in the setting of vaginal bleeding, pelvic pain, and fevers. On arrival, patient febrile to 101.4, afebrile s/p administration of Tylenol. IVF started. CBC/CMP obtained. Patient reports light vaginal spotting. Last BM this AM. Reports mild abdominal cramping but denies severe pain. UA negative nitrites/leuks. Blood and urine culture ordered however s/p antibiotics at OSH. Rocephin x1 administered in the setting of fever. CBC/CMP pending. Plan for supportive care with pain control and IV Abx.   2022 - AM H/H roughly stable @ 6.9/21. However patient now symptomatic so will transfuse. Patient remains asymptomatic. Continued mild range fevers overnight, Rocephin discontinued & Zosyn initiated. Continue supportive care with pain control & IV Abx.  2022 AM Hgb improved after 1upRBC @ 7.5, asymptomatic. Fever overnight 103.4, resolved 100.1 with Tylenol. No other signs of infection. Continuing Zoysn. CT AP this AM.           Interval History: Fever overnight to 103.4, other VSS. Resolved to 100.1 with Tylenol. Reports vaginal bleeding she noticed while wiping after urination. Denies symptoms of anemia, no CP SOB fatigue or lightheadedness with ambulation. Reports non pruritic rash underneath breasts and back. Denies pain.     Scheduled Meds:   ferrous sulfate  1 tablet Oral Daily    hydrocortisone   Topical (Top) BID    ketorolac  30 mg  Intravenous Q6H    medroxyPROGESTERone  10 mg Oral Daily    piperacillin-tazobactam (ZOSYN) IVPB  4.5 g Intravenous Q8H    senna-docusate 8.6-50 mg  1 tablet Oral BID     Continuous Infusions:  PRN Meds:sodium chloride, acetaminophen, calcium carbonate, diphenhydrAMINE, hydrALAZINE, melatonin, metoclopramide HCl, ondansetron, oxyCODONE, oxyCODONE, polyethylene glycol, simethicone, sodium chloride 0.9%    Review of patient's allergies indicates:  Not on File    Objective:     Vital Signs (Most Recent):  Temp: 98.1 °F (36.7 °C) (08/13/22 0719)  Pulse: 86 (08/13/22 0719)  Resp: 18 (08/13/22 0719)  BP: 115/62 (08/13/22 0719)  SpO2: 99 % (08/13/22 0719) Vital Signs (24h Range):  Temp:  [98 °F (36.7 °C)-103.4 °F (39.7 °C)] 98.1 °F (36.7 °C)  Pulse:  [] 86  Resp:  [16-19] 18  SpO2:  [93 %-99 %] 99 %  BP: (105-134)/(53-71) 115/62     Weight: 90.5 kg (199 lb 8.3 oz)  Body mass index is 35.34 kg/m².    Intake/Output - Last 3 Shifts         08/11 0700  08/12 0659 08/12 0700 08/13 0659 08/13 0700 08/14 0659    P.O. 960      Blood  300     Total Intake(mL/kg) 960 (10.6) 300 (3.3)     Urine (mL/kg/hr) 100      Stool 0      Total Output 100      Net +860 +300            Stool Occurrence 0 x                 Physical Exam:   Constitutional: She is oriented to person, place, and time. She appears well-developed and well-nourished. No distress.    HENT:   Head: Normocephalic and atraumatic.    Eyes: EOM are normal.     Cardiovascular:  Normal rate and intact distal pulses.      Exam reveals no edema.        Pulmonary/Chest: Effort normal and breath sounds normal. No respiratory distress.        Abdominal: Soft. She exhibits no distension. There is no abdominal tenderness.             Musculoskeletal: Normal range of motion and moves all extremeties. No tenderness or edema.       Neurological: She is alert and oriented to person, place, and time.    Skin: Skin is warm and dry. Rash noted. No erythema. No pallor.   Papular  erythematous rash underneath breasts and diffuse throughout back, non tender no bullae oozing or bleeding skin intact throughout     Psychiatric: She has a normal mood and affect.     Lines/Drains/Airways       Peripheral Intravenous Line  Duration                  Peripheral IV - Single Lumen 08/12/22 1523 20 G;1 3/4 in Left Forearm <1 day         Peripheral IV - Single Lumen 08/12/22 1523 20 G;1 3/4 in Left Upper Arm <1 day                    Laboratory:  Recent Lab Results         08/13/22  0335   08/12/22  0828   08/12/22  0809        Unit Blood Type Code   5100         Unit Expiration   [id number]         Unit Blood Type   O POS         Blood Culture, Routine     No Growth to date  [P]       CODING SYSTEM   [id number]         DISPENSE STATUS   TRANSFUSED         Group & Rh   AB POS         Hemoglobin 7.5           INDIRECT CORNELL   NEG         Product Code   [id number]         UNIT NUMBER   [id number]                  [P] - Preliminary Result               Diagnostic Results:  CT: Reviewed  PENDING     Assessment/Plan:     * Post-menopausal bleeding  - s/p Endometrial ablation circa 6274-1620  - Patient reports PMB beginning approximately one year ago with negative endometrial biopsies and controlled with provera  - Patient then presented with acute vaginal bleeding with associated pain and fevers and transferred to [facility] [place]  - TVUS consistent with large fibroid uterus, including possible fundal/pedunculated fibroid  - Tumor markers obtained at OSH but unavailable,  here elevated  - Endometrial biopsy performed at OSH, pathology pending  - Bleeding likely secondary to uterine fibroids, however Embx pending  - Continue home Provera 10 mg  - Continue scheduled Toradol and Tylenol PRN for pain  - Bleeding improved, H/H stable at 6.9/21; patient now with symptoms of anemia > 1u pRBC via midline catheter > Hgb 7.5    Fibroids  - Multiple fibroids on TVUS, hard to characterize  - Concern for degeneration as main  reason for pain, fevers  - Plan for MRI imaging    Transaminitis  - AST/ALT 99/94  - Likely acute phase reactant  - Hep panel pending    Fever  - Isolated 101.4 temperature initially on admission, now with recurrent low grade fevers overnight of 100.4F, overnight fever 103.4 @ 1830 8/12 resolved to 100.1 with Tylenol   - Will d/c Rocephin & initiate Zosyn in setting of continued fevers  - Tylenol currently ordered PRN for pain, nursing communication placed this AM to please call/page to notify if fevers present prior to giving Tylenol  - UA negative, urine culture no growth, blood cultures #1 NGTD however obtained s/p antibiotic treatment at OSH; blood cultures #2 after Zosyn initiated NGTD   - Denies vaginal discharge, concern for PID low  - 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 NA     Florencia Narvaez MD  Gynecologic Oncology  [facility] - Telemetry Stepdown

## 2022-08-13 NOTE — SUBJECTIVE & OBJECTIVE
Interval History: Fever overnight to 103.4, other VSS. Resolved to 100.1 with Tylenol. Reports vaginal bleeding she noticed while wiping after urination. Denies symptoms of anemia, no CP SOB fatigue or lightheadedness with ambulation. Reports non pruritic rash underneath breasts and back. Denies pain.     Scheduled Meds:   ferrous sulfate  1 tablet Oral Daily    hydrocortisone   Topical (Top) BID    ketorolac  30 mg Intravenous Q6H    medroxyPROGESTERone  10 mg Oral Daily    piperacillin-tazobactam (ZOSYN) IVPB  4.5 g Intravenous Q8H    senna-docusate 8.6-50 mg  1 tablet Oral BID     Continuous Infusions:  PRN Meds:sodium chloride, acetaminophen, calcium carbonate, diphenhydrAMINE, hydrALAZINE, melatonin, metoclopramide HCl, ondansetron, oxyCODONE, oxyCODONE, polyethylene glycol, simethicone, sodium chloride 0.9%    Review of patient's allergies indicates:  Not on File    Objective:     Vital Signs (Most Recent):  Temp: 98.1 °F (36.7 °C) (08/13/22 0719)  Pulse: 86 (08/13/22 0719)  Resp: 18 (08/13/22 0719)  BP: 115/62 (08/13/22 0719)  SpO2: 99 % (08/13/22 0719) Vital Signs (24h Range):  Temp:  [98 °F (36.7 °C)-103.4 °F (39.7 °C)] 98.1 °F (36.7 °C)  Pulse:  [] 86  Resp:  [16-19] 18  SpO2:  [93 %-99 %] 99 %  BP: (105-134)/(53-71) 115/62     Weight: 90.5 kg (199 lb 8.3 oz)  Body mass index is 35.34 kg/m².    Intake/Output - Last 3 Shifts         08/11 0700 08/12 0659 08/12 0700 08/13 0659 08/13 0700 08/14 0659    P.O. 960      Blood  300     Total Intake(mL/kg) 960 (10.6) 300 (3.3)     Urine (mL/kg/hr) 100      Stool 0      Total Output 100      Net +860 +300            Stool Occurrence 0 x                 Physical Exam:   Constitutional: She is oriented to person, place, and time. She appears well-developed and well-nourished. No distress.    HENT:   Head: Normocephalic and atraumatic.    Eyes: EOM are normal.     Cardiovascular:  Normal rate and intact distal pulses.      Exam reveals no edema.         Pulmonary/Chest: Effort normal and breath sounds normal. No respiratory distress.        Abdominal: Soft. She exhibits no distension. There is no abdominal tenderness.             Musculoskeletal: Normal range of motion and moves all extremeties. No tenderness or edema.       Neurological: She is alert and oriented to person, place, and time.    Skin: Skin is warm and dry. Rash noted. No erythema. No pallor.   Papular erythematous rash underneath breasts and diffuse throughout back, non tender no bullae oozing or bleeding skin intact throughout     Psychiatric: She has a normal mood and affect.     Lines/Drains/Airways       Peripheral Intravenous Line  Duration                  Peripheral IV - Single Lumen 08/12/22 1523 20 G;1 3/4 in Left Forearm <1 day         Peripheral IV - Single Lumen 08/12/22 1523 20 G;1 3/4 in Left Upper Arm <1 day                    Laboratory:  Recent Lab Results         08/13/22  0335   08/12/22  0828   08/12/22  0809        Unit Blood Type Code   5100         Unit Expiration   [id number]         Unit Blood Type   O POS         Blood Culture, Routine     No Growth to date  [P]       CODING SYSTEM   [id number]         DISPENSE STATUS   TRANSFUSED         Group & Rh   AB POS         Hemoglobin 7.5           INDIRECT CORNELL   NEG         Product Code   [id number]         UNIT NUMBER   [id number]                  [P] - Preliminary Result               Diagnostic Results:  CT: Reviewed  PENDING

## 2022-08-13 NOTE — ASSESSMENT & PLAN NOTE
- s/p Endometrial ablation circa 3596-1699  - Patient reports PMB beginning approximately one year ago with negative endometrial biopsies and controlled with provera  - Patient then presented with acute vaginal bleeding with associated pain and fevers and [place] [facility] [place]  - TVUS consistent with large fibroid uterus, including possible fundal/pedunculated fibroid  - Tumor markers obtained at OSH but unavailable,  here elevated  - Endometrial biopsy performed at OSH, pathology pending  - Bleeding likely secondary to uterine fibroids, however Embx pending  - Continue home Provera 10 mg  - Continue scheduled Toradol and Tylenol PRN for pain  - Bleeding improved, H/H stable at 6.9/21; patient now with symptoms of anemia > 1u pRBC via midline catheter > Hgb 7.5

## 2022-08-14 PROBLEM — R51.9 HEADACHE: Status: ACTIVE | Noted: 2022-08-14

## 2022-08-14 LAB
CREAT SERPL-MCNC: 0.8 MG/DL (ref 0.5–1.4)
EST. GFR  (NO RACE VARIABLE): >60 ML/MIN/1.73 M^2
HGB BLD-MCNC: 8.6 G/DL (ref 12–16)

## 2022-08-14 PROCEDURE — 94761 N-INVAS EAR/PLS OXIMETRY MLT: CPT

## 2022-08-14 PROCEDURE — 36415 COLL VENOUS BLD VENIPUNCTURE: CPT

## 2022-08-14 PROCEDURE — 63600175 PHARM REV CODE 636 W HCPCS: Performed by: STUDENT IN AN ORGANIZED HEALTH CARE EDUCATION/TRAINING PROGRAM

## 2022-08-14 PROCEDURE — 20600001 HC STEP DOWN PRIVATE ROOM

## 2022-08-14 PROCEDURE — 99233 SBSQ HOSP IP/OBS HIGH 50: CPT | Mod: ,,, | Performed by: OBSTETRICS & GYNECOLOGY

## 2022-08-14 PROCEDURE — 25000003 PHARM REV CODE 250: Performed by: STUDENT IN AN ORGANIZED HEALTH CARE EDUCATION/TRAINING PROGRAM

## 2022-08-14 PROCEDURE — 85018 HEMOGLOBIN: CPT

## 2022-08-14 PROCEDURE — 99233 PR SUBSEQUENT HOSPITAL CARE,LEVL III: ICD-10-PCS | Mod: ,,, | Performed by: OBSTETRICS & GYNECOLOGY

## 2022-08-14 PROCEDURE — 82565 ASSAY OF CREATININE: CPT

## 2022-08-14 PROCEDURE — 99900035 HC TECH TIME PER 15 MIN (STAT)

## 2022-08-14 PROCEDURE — 25000003 PHARM REV CODE 250

## 2022-08-14 PROCEDURE — 25000003 PHARM REV CODE 250: Performed by: OBSTETRICS & GYNECOLOGY

## 2022-08-14 PROCEDURE — 63600175 PHARM REV CODE 636 W HCPCS

## 2022-08-14 PROCEDURE — 94799 UNLISTED PULMONARY SVC/PX: CPT

## 2022-08-14 RX ORDER — AMOXICILLIN AND CLAVULANATE POTASSIUM 875; 125 MG/1; MG/1
1 TABLET, FILM COATED ORAL EVERY 12 HOURS
Status: DISCONTINUED | OUTPATIENT
Start: 2022-08-14 | End: 2022-08-14

## 2022-08-14 RX ORDER — AMOXICILLIN AND CLAVULANATE POTASSIUM 875; 125 MG/1; MG/1
1 TABLET, FILM COATED ORAL EVERY 12 HOURS
Status: DISCONTINUED | OUTPATIENT
Start: 2022-08-14 | End: 2022-08-15 | Stop reason: HOSPADM

## 2022-08-14 RX ORDER — ENOXAPARIN SODIUM 100 MG/ML
40 INJECTION SUBCUTANEOUS EVERY 24 HOURS
Status: DISCONTINUED | OUTPATIENT
Start: 2022-08-14 | End: 2022-08-15 | Stop reason: HOSPADM

## 2022-08-14 RX ORDER — ACETAMINOPHEN 500 MG
1000 TABLET ORAL ONCE
Status: COMPLETED | OUTPATIENT
Start: 2022-08-14 | End: 2022-08-14

## 2022-08-14 RX ADMIN — HYDROCORTISONE: 10 CREAM TOPICAL at 08:08

## 2022-08-14 RX ADMIN — IBUPROFEN 600 MG: 600 TABLET ORAL at 06:08

## 2022-08-14 RX ADMIN — AMOXICILLIN AND CLAVULANATE POTASSIUM 1 TABLET: 875; 125 TABLET, FILM COATED ORAL at 11:08

## 2022-08-14 RX ADMIN — MEDROXYPROGESTERONE ACETATE 10 MG: 10 TABLET ORAL at 08:08

## 2022-08-14 RX ADMIN — AMOXICILLIN AND CLAVULANATE POTASSIUM 1 TABLET: 875; 125 TABLET, FILM COATED ORAL at 08:08

## 2022-08-14 RX ADMIN — ACETAMINOPHEN 1000 MG: 500 TABLET ORAL at 08:08

## 2022-08-14 RX ADMIN — FERROUS SULFATE TAB 325 MG (65 MG ELEMENTAL FE) 1 EACH: 325 (65 FE) TAB at 08:08

## 2022-08-14 RX ADMIN — SENNOSIDES AND DOCUSATE SODIUM 1 TABLET: 50; 8.6 TABLET ORAL at 08:08

## 2022-08-14 RX ADMIN — OXYCODONE 5 MG: 5 TABLET ORAL at 02:08

## 2022-08-14 RX ADMIN — ENOXAPARIN SODIUM 40 MG: 100 INJECTION SUBCUTANEOUS at 05:08

## 2022-08-14 RX ADMIN — PIPERACILLIN SODIUM AND TAZOBACTAM SODIUM 4.5 G: 4; .5 INJECTION, POWDER, LYOPHILIZED, FOR SOLUTION INTRAVENOUS at 06:08

## 2022-08-14 RX ADMIN — OXYCODONE 5 MG: 5 TABLET ORAL at 01:08

## 2022-08-14 NOTE — ASSESSMENT & PLAN NOTE
- Isolated 101.4 temperature initially on admission, now with recurrent low grade fevers overnight of 100.4F, overnight fever 103.4 @ 1830 8/12 resolved to 100.1 with Tylenol, afebrile since   - Will d/c Rocephin & initiate Zosyn in setting of continued fevers  - Tylenol currently ordered PRN for pain, nursing communication placed this AM to please call/page to notify if fevers present prior to giving Tylenol  - UA negative, urine culture no growth, blood cultures #1 NGTD however obtained s/p antibiotic treatment at OSH; blood cultures #2 after Zosyn initiated NGTD   - Denies vaginal discharge, concern for PID low  - 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 \par 5/19/2021 sonogram showing multinodular thyroid, largest on the left lobe measuring 1.3 cm nodules were noted on CT pt denies dysphagia, dyspnea, or hoarseness. \par  was seen by ENT Dr Decker \par \par plan was to repeat thyroid sono in 1 year \par

## 2022-08-14 NOTE — ASSESSMENT & PLAN NOTE
- Multiple fibroids on TVUS, hard to characterize  - Concern for degeneration as main reason for pain, fevers  - Plan for MRI imaging   

## 2022-08-14 NOTE — ASSESSMENT & PLAN NOTE
- s/p Endometrial ablation circa 9647-2317  - Patient reports PMB beginning approximately one year ago with negative endometrial biopsies and controlled with provera  - Patient then presented with acute vaginal bleeding with associated pain and fevers and [place] [facility] [place]  - TVUS consistent with large fibroid uterus, including possible fundal/pedunculated fibroid  - Tumor markers obtained at OSH but unavailable,  here elevated   - Inhibin A/B elevated 24, LDH elevated    - AFP, bHcG, CEA, CA 19-9 WNL   - Endometrial biopsy performed at OSH, pathology pending  - Bleeding likely secondary to uterine fibroids, however Embx pending  - Continue home Provera 10 mg  - Continue scheduled ibuprofen and OXY IR PRN for pain  - Bleeding improved, H/H stable at 6.9/21; patient now with symptoms of anemia > 1u pRBC via midline catheter > Hgb 7.5 . 1u pRBC > Hgb 9.7 > 8.6  - Cr elevated above baseline likely secondary to bleeding 0.8 > 1.0 > 1.0 > 0.8 after 2u pRBC and mIVF; will d/c mIVF as tolerating PO   - CT CAP 20cm mass differential pedunculated fibroid with the possibility of malignant degeneration/leiomyosarcoma to be considered.  Solid ovarian neoplasm is also included in the differential diagnosis   - Plan for ex lap with Dr Coffey 8/15; case request and pre op orders to be placed. Pre op CXR and EKG wnl.   - Lovenox prophylaxis initiated 8/13; will hold for surgery

## 2022-08-14 NOTE — PLAN OF CARE
Patient continues on room air. IV abx switched to PO. IV fluids stopped. PRN pain medication given x1 for pelvic pain. TMAX of 99.6   Patient stable at this time, no acute changes.       Problem: Adult Inpatient Plan of Care  Goal: Plan of Care Review  Outcome: Ongoing, Progressing  Goal: Patient-Specific Goal (Individualized)  Outcome: Ongoing, Progressing  Goal: Absence of Hospital-Acquired Illness or Injury  Outcome: Ongoing, Progressing  Goal: Optimal Comfort and Wellbeing  Outcome: Ongoing, Progressing  Goal: Readiness for Transition of Care  Outcome: Ongoing, Progressing

## 2022-08-14 NOTE — ASSESSMENT & PLAN NOTE
- Longstanding history of AUB and fibroids  - Multiple fibroids on TVUS, hard to characterize  - CT CAP 8/13: differential pedunculated fibroid (20cm) with the possibility of malignant degeneration/leiomyosarcoma to be considered.  Solid ovarian neoplasm is also included in the differential diagnosis

## 2022-08-14 NOTE — PROGRESS NOTES
[facility] Oncology ([facility])  Gynecologic Oncology  Progress Note      Patient Name: Priyanka Raines  MRN: [id number]  Admission Date: 2022  Hospital Length of Stay: 3 days  Attending Provider: Melvin Coffey MD  Primary Care Provider: Primary Doctor No  Principal Problem: Post-menopausal bleeding    Follow-up For: * No surgery found *  Post-Operative Day:    Subjective:      History of Present Illness:  Priyanka Raines is a 57 y.o.  presents as transfer from OSH in the setting of vaginal bleeding, pelvic pain, and fevers. Patient had been admitted twice in the setting of vaginal bleeding and was thought to be secondary to uterine fibroids. Patient then developed isolated fevers for which blood and urine cultures were obtained, and she was started on antibiotics (unclear which antibiotic therapy was started). Review of outside records reveals normal daily CBC (no WBC), TVUS showed enlarged uterus measuring 15.6 x 6.7 x 10.9 cm with enlarged midline mass measuring 7.3 x 8.5 cm likely pedunculated fibroid or ovarian dermoid, tumor markers drawn, and endometrial biopsy completed with sound to 10 cm, however results unavailable.     On arrival to [facility] [place], patient had isolated fever 101.4, , BP wnl, 94% on RA. She was started on IV Rocephin, fluid bolus and STAT labs ordered.    On evaluation, patient resting comfortably in bed. Able to converse in complete sentences. She is accompanied by her  and son. She reports mild pelvic cramping and light vaginal spotting. She denies nausea/vomiting, constipation, diarrhea, dysuria, CP, or SOB.    Patient reports long standing history of PMB. She previously had AUB with menstrual cycles and is s/p endometrial ablation greater than 15 years ago. She noticed spotting recently for which she underwent endometrial biopsies x2 which were negative for malignancy per patient report. She was started on Provera with control of bleeding. She has known history of  uterine fibroids. Patient reports this past week she had an episode of bleeding and associated pain which prompted evaluation in the ED.      Ob/Gyn history  ,  x1  H/o of endometrial ablation in the setting of AUB approximately 15 yo  Denies history of STDs  Receives routine Ob/Gyn care at [facility]. Last pap smear one year ago and normal per patient report  Currently sexually active with one male partner  Menopause age 54  Cousin with h/o breast cancer, no first degree relatives with history of breast/Gyn cancer          Hospital Course:  2022 Transfer from OSH in the setting of vaginal bleeding, pelvic pain, and fevers. On arrival, patient febrile to 101.4, afebrile s/p administration of Tylenol. IVF started. CBC/CMP obtained. Patient reports light vaginal spotting. Last BM this AM. Reports mild abdominal cramping but denies severe pain. UA negative nitrites/leuks. Blood and urine culture ordered however s/p antibiotics at OSH. Rocephin x1 administered in the setting of fever. CBC/CMP pending. Plan for supportive care with pain control and IV Abx.   2022 - AM H/H roughly stable @ 6.9/21. However patient now symptomatic so will transfuse. Patient remains asymptomatic. Continued mild range fevers overnight, Rocephin discontinued & Zosyn initiated. Continue supportive care with pain control & IV Abx.  2022 AM Hgb improved after 1upRBC @ 7.5, asymptomatic > repeat 1u pRBC. Fever overnight 103.4 at end of blood transfusion, resolved 100.1 with Tylenol. No other signs of infection. Continuing Zoysn, pre treating transfusion with Benadryl and Tylenol. CT AP 20cm fibroid. DC Toradol  > ibuprofen. DC Tylenol PRN, nursing to call if any fevers. CXR and EKG for pre op clearance. PM CBC and BMP. Starting prophylactic Lovenox.           Interval History: NAEON. Patient reports headache not relieved by ibuprofen. Overall feeling well, denies fatigue SOB. Pain well controlled. No vaginal bleeding.  Tolerating PO. Reports baseline hot flashes.     Scheduled Meds:   enoxaparin  40 mg Subcutaneous Daily    ferrous sulfate  1 tablet Oral Daily    hydrocortisone   Topical (Top) BID    ibuprofen  600 mg Oral Q6H    medroxyPROGESTERone  10 mg Oral Daily    piperacillin-tazobactam (ZOSYN) IVPB  4.5 g Intravenous Q8H    senna-docusate 8.6-50 mg  1 tablet Oral BID     Continuous Infusions:   lactated ringers 40 mL/hr at 08/13/22 1221     PRN Meds:sodium chloride, sodium chloride, calcium carbonate, diphenhydrAMINE, hydrALAZINE, melatonin, metoclopramide HCl, ondansetron, oxyCODONE, oxyCODONE, polyethylene glycol, simethicone, sodium chloride 0.9%    Review of patient's allergies indicates:  No Known Allergies    Objective:     Vital Signs (Most Recent):  Temp: 98.6 °F (37 °C) (08/14/22 0745)  Pulse: 79 (08/14/22 0745)  Resp: 16 (08/14/22 0745)  BP: 134/66 (08/14/22 0745)  SpO2: 97 % (08/14/22 0745) Vital Signs (24h Range):  Temp:  [98.6 °F (37 °C)-99.8 °F (37.7 °C)] 98.6 °F (37 °C)  Pulse:  [] 79  Resp:  [15-20] 16  SpO2:  [95 %-100 %] 97 %  BP: (120-143)/(59-72) 134/66     Weight: 90.5 kg (199 lb 8.3 oz)  Body mass index is 35.34 kg/m².    Intake/Output - Last 3 Shifts         08/12 0700 08/13 0659 08/13 0700 08/14 0659 08/14 0700  08/15 0659    P.O.  600     Blood 300 422.1     Total Intake(mL/kg) 300 (3.3) 1022.1 (11.3)     Urine (mL/kg/hr)  490 (0.2)     Stool       Total Output  490     Net +300 +532.1            Urine Occurrence  701 x     Stool Occurrence  0 x                Physical Exam:   Constitutional: She is oriented to person, place, and time. She appears well-developed and well-nourished. No distress.    HENT:   Head: Normocephalic and atraumatic.    Eyes: EOM are normal.     Cardiovascular:  Normal rate and intact distal pulses.      Exam reveals no edema.        Pulmonary/Chest: Effort normal and breath sounds normal. No respiratory distress.        Abdominal: Soft. She exhibits no  distension. There is no abdominal tenderness.             Musculoskeletal: Normal range of motion and moves all extremeties. No tenderness or edema.       Neurological: She is alert and oriented to person, place, and time.    Skin: Skin is warm and dry. No erythema. No pallor.    Psychiatric: She has a normal mood and affect.     Lines/Drains/Airways       Peripheral Intravenous Line  Duration                  Peripheral IV - Single Lumen 08/12/22 1523 20 G;1 3/4 in Left Forearm 1 day         Peripheral IV - Single Lumen 08/12/22 1523 20 G;1 3/4 in Left Upper Arm 1 day                    Laboratory:  Recent Lab Results         08/14/22  0603   08/13/22  1855   08/13/22  1103        Anion Gap   11         Baso #   0.02         Basophil %   0.3         BUN   10         Calcium   9.6         Chloride   103         CO2   24         Creatinine 0.8   1.0   1.0       Differential Method   Automated         eGFR >60.0   >60.0   >60.0       Eos #   0.5         Eosinophil %   6.1         Glucose   112         Gran # (ANC)   5.4         Gran %   71.6         Hematocrit   29.4         Hemoglobin 8.6   9.7         Immature Grans (Abs)   0.07  Comment: Mild elevation in immature granulocytes is non specific and   can be seen in a variety of conditions including stress response,   acute inflammation, trauma and pregnancy. Correlation with other   laboratory and clinical findings is essential.           Immature Granulocytes   0.9         Lymph #   1.1         Lymph %   14.6         MCH   28.0         MCHC   33.0         MCV   85         Mono #   0.5         Mono %   6.5         MPV   9.3         nRBC   0         Platelets   362         Potassium   3.8         RBC   3.46         RDW   14.0         Sodium   138         WBC   7.53                 Diagnostic Results:    CTAP w contrast 8/13/22  FINDINGS:  Abdomen:  - Lung bases: Clear.  - Liver: Subcentimeter low-density lesion in the left lobe not able to be characterized with  certainty but statistically small cyst or hemangioma.  - Gallbladder and bile ducts: Unremarkable.  - Spleen: Mildly enlarged with granulomatous calcifications.  - Pancreas: Normal.  - Kidneys: No mass or hydronephrosis.  - Adrenals: Unremarkable.  - Retroperitoneum:  No significant adenopathy  - Vascular: Unremarkable.  - Bowel/mesentery: Unremarkable.     Pelvis:  The uterus is enlarged with multiple fibroids.  Arising from the central pelvis is a large 20 by 14 x 10 cm mass that extends cephalad to the level of the umbilicus.  The mass is mildly heterogeneous but solid with no significant cystic components.  No definite fat plane exists between the uterus and this mass and normal ovarian tissue is difficult to appreciate with certainty.  There is no evidence of pelvic ascites.  The urinary bladder is decompressed although there is some perivesical fat stranding noted.     Bones:  Unremarkable.     Impression:  Large solid mass arising from the pelvis, likely uterine or ovarian in origin.  Differential diagnosis includes extremely large pedunculated fibroid with the possibility of malignant degeneration/leiomyosarcoma to be considered.  Solid ovarian neoplasm is also included in the differential diagnosis.     Nonspecific perivesicular fat stranding could indicate urinary bladder inflammation of any cause.       Assessment/Plan:     * Post-menopausal bleeding  - s/p Endometrial ablation circa [id number]  - Patient reports PMB beginning approximately one year ago with negative endometrial biopsies and controlled with provera  - Patient then presented with acute vaginal bleeding with associated pain and fevers and transferred to [facility] [place]  - TVUS consistent with large fibroid uterus, including possible fundal/pedunculated fibroid  - Tumor markers obtained at OSH but unavailable,  here elevated   - Inhibin A/B slightly elevated 24, LDH elevated   - AFP, bHcG, CEA, CA 19-9 WNL    - Pattern not consistent with ovarian  tumor more likely secondary to necrotizing tissue  - Endometrial biopsy performed at OSH, pathology pending  - Bleeding likely secondary to uterine fibroids, however Embx pending  - Continue home Provera 10 mg  - Continue OXY IR PRN for pain, d/c ibuprofen   - Bleeding improved, H/H stable at 6.9/21; patient now with symptoms of anemia > 1u pRBC via midline catheter > Hgb 7.5 . 1u pRBC > Hgb 9.7 > 8.6  - Cr elevated above baseline likely secondary to bleeding 0.8 > 1.0 > 1.0 > 0.8 after 2u pRBC and mIVF; will d/c mIVF as tolerating PO   - CT CAP 20cm mass differential pedunculated fibroid with the possibility of malignant degeneration/leiomyosarcoma to be considered.  Solid ovarian neoplasm is also included in the differential diagnosis   - Will discuss surgical planning with primary, Dr Coffey   - Lovenox prophylaxis initiated 8/13; will hold tonight in case of surgery tomorrow AM     Headache  - New HA this AM unresponsive to ibuprofen, likely secondary to anemia   - Tylenol 1000mg     Fibroids  - Multiple fibroids on TVUS, hard to characterize  - Concern for degeneration as main reason for pain, fevers      Transaminitis  - AST/ALT 99/94  - Likely acute phase reactant  - Hep panel pending    Fever  - Isolated 101.4 temperature initially on admission, now with recurrent low grade fevers overnight of 100.4F, overnight fever 103.4 @ 1830 8/12 resolved to 100.1 with Tylenol, afebrile since   - S/p Rocephin (8/11), Zosyn (8/12-8/14) > PO Augmentin (8/14-8/24)   - Nursing communication placed this AM to please call/page to notify if fevers present prior to giving Tylenol  - UA negative, urine culture no growth, blood cultures #1 NGTD however obtained s/p antibiotic treatment at OSH; blood cultures #2 after Zosyn initiated NGTD   - Denies vaginal discharge, concern for PID low  - Fevers likely secondary to uterine fibroid necrosis, will continue to look for signs/symptoms of infection         VTE Risk Mitigation (From  admission, onward)         Ordered     enoxaparin injection 40 mg  Daily         08/13/22 1721     IP VTE HIGH RISK PATIENT  Once         08/12/22 1222     Place sequential compression device  Until discontinued         08/12/22 1222     Place KAYLEN hose  Until discontinued         08/12/22 1222                Was patel catheter removed? No: NA    Florencia Narvaez MD  Gynecologic Oncology  [facility] - Oncology ([facility])

## 2022-08-14 NOTE — ASSESSMENT & PLAN NOTE
- s/p Endometrial ablation circa [id number]  - Patient reports PMB beginning approximately one year ago with negative endometrial biopsies and controlled with provera  - Patient then presented with acute vaginal bleeding with associated pain and fevers [place] [facility] [place]  - TVUS consistent with large fibroid uterus, including possible fundal/pedunculated fibroid  - Tumor markers obtained at OSH,  here elevated   - Inhibin A/B slightly elevated 24, LDH elevated   - AFP, bHcG, CEA, CA 19-9 WNL    - Pattern not consistent with ovarian tumor more likely secondary to necrotizing tissue  - Endometrial biopsy performed at OSH, pathology pending  - Bleeding likely secondary to uterine fibroids, however Embx pending  - Continue home Provera 10 mg  - Continue OXY IR PRN for pain, d/c ibuprofen   - Bleeding improved, H/H stable at 6.9/21; patient now with symptoms of anemia > 1u pRBC via midline catheter > Hgb 7.5 . 1u pRBC > Hgb 9.7 > 8.6  - Cr elevated above baseline likely secondary to bleeding 0.8 > 1.0 > 1.0 > 0.8 after 2u pRBC and mIVF; will d/c mIVF as tolerating PO   - CT CAP 20cm mass differential pedunculated fibroid with the possibility of malignant degeneration/leiomyosarcoma to be considered.  Solid ovarian neoplasm is also included in the differential diagnosis   - Lovenox prophylaxis initiated 8/13 for inpatient care only   - Schedule for outpatient WEN with Dr Coffey

## 2022-08-14 NOTE — SUBJECTIVE & OBJECTIVE
Interval History: NAEON. Patient reports headache not relieved by ibuprofen. Overall feeling well, denies fatigue SOB. Pain well controlled. No vaginal bleeding. Tolerating PO. Reports baseline hot flashes.     Scheduled Meds:   enoxaparin  40 mg Subcutaneous Daily    ferrous sulfate  1 tablet Oral Daily    hydrocortisone   Topical (Top) BID    ibuprofen  600 mg Oral Q6H    medroxyPROGESTERone  10 mg Oral Daily    piperacillin-tazobactam (ZOSYN) IVPB  4.5 g Intravenous Q8H    senna-docusate 8.6-50 mg  1 tablet Oral BID     Continuous Infusions:   lactated ringers 40 mL/hr at 08/13/22 1221     PRN Meds:sodium chloride, sodium chloride, calcium carbonate, diphenhydrAMINE, hydrALAZINE, melatonin, metoclopramide HCl, ondansetron, oxyCODONE, oxyCODONE, polyethylene glycol, simethicone, sodium chloride 0.9%    Review of patient's allergies indicates:  No Known Allergies    Objective:     Vital Signs (Most Recent):  Temp: 98.6 °F (37 °C) (08/14/22 0745)  Pulse: 79 (08/14/22 0745)  Resp: 16 (08/14/22 0745)  BP: 134/66 (08/14/22 0745)  SpO2: 97 % (08/14/22 0745) Vital Signs (24h Range):  Temp:  [98.6 °F (37 °C)-99.8 °F (37.7 °C)] 98.6 °F (37 °C)  Pulse:  [] 79  Resp:  [15-20] 16  SpO2:  [95 %-100 %] 97 %  BP: (120-143)/(59-72) 134/66     Weight: 90.5 kg (199 lb 8.3 oz)  Body mass index is 35.34 kg/m².    Intake/Output - Last 3 Shifts         08/12 0700  08/13 0659 08/13 0700  08/14 0659 08/14 0700  08/15 0659    P.O.  600     Blood 300 422.1     Total Intake(mL/kg) 300 (3.3) 1022.1 (11.3)     Urine (mL/kg/hr)  490 (0.2)     Stool       Total Output  490     Net +300 +532.1            Urine Occurrence  701 x     Stool Occurrence  0 x                Physical Exam:   Constitutional: She is oriented to person, place, and time. She appears well-developed and well-nourished. No distress.    HENT:   Head: Normocephalic and atraumatic.    Eyes: EOM are normal.     Cardiovascular:  Normal rate and intact distal pulses.       Exam reveals no edema.        Pulmonary/Chest: Effort normal and breath sounds normal. No respiratory distress.        Abdominal: Soft. She exhibits no distension. There is no abdominal tenderness.             Musculoskeletal: Normal range of motion and moves all extremeties. No tenderness or edema.       Neurological: She is alert and oriented to person, place, and time.    Skin: Skin is warm and dry. No erythema. No pallor.    Psychiatric: She has a normal mood and affect.     Lines/Drains/Airways       Peripheral Intravenous Line  Duration                  Peripheral IV - Single Lumen 08/12/22 1523 20 G;1 3/4 in Left Forearm 1 day         Peripheral IV - Single Lumen 08/12/22 1523 20 G;1 3/4 in Left Upper Arm 1 day                    Laboratory:  Recent Lab Results         08/14/22  0603   08/13/22  1855   08/13/22  1103        Anion Gap   11         Baso #   0.02         Basophil %   0.3         BUN   10         Calcium   9.6         Chloride   103         CO2   24         Creatinine 0.8   1.0   1.0       Differential Method   Automated         eGFR >60.0   >60.0   >60.0       Eos #   0.5         Eosinophil %   6.1         Glucose   112         Gran # (ANC)   5.4         Gran %   71.6         Hematocrit   29.4         Hemoglobin 8.6   9.7         Immature Grans (Abs)   0.07  Comment: Mild elevation in immature granulocytes is non specific and   can be seen in a variety of conditions including stress response,   acute inflammation, trauma and pregnancy. Correlation with other   laboratory and clinical findings is essential.           Immature Granulocytes   0.9         Lymph #   1.1         Lymph %   14.6         MCH   28.0         MCHC   33.0         MCV   85         Mono #   0.5         Mono %   6.5         MPV   9.3         nRBC   0         Platelets   362         Potassium   3.8         RBC   3.46         RDW   14.0         Sodium   138         WBC   7.53                 Diagnostic Results:    CTAP w contrast  8/13/22  FINDINGS:  Abdomen:  - Lung bases: Clear.  - Liver: Subcentimeter low-density lesion in the left lobe not able to be characterized with certainty but statistically small cyst or hemangioma.  - Gallbladder and bile ducts: Unremarkable.  - Spleen: Mildly enlarged with granulomatous calcifications.  - Pancreas: Normal.  - Kidneys: No mass or hydronephrosis.  - Adrenals: Unremarkable.  - Retroperitoneum:  No significant adenopathy  - Vascular: Unremarkable.  - Bowel/mesentery: Unremarkable.     Pelvis:  The uterus is enlarged with multiple fibroids.  Arising from the central pelvis is a large 20 by 14 x 10 cm mass that extends cephalad to the level of the umbilicus.  The mass is mildly heterogeneous but solid with no significant cystic components.  No definite fat plane exists between the uterus and this mass and normal ovarian tissue is difficult to appreciate with certainty.  There is no evidence of pelvic ascites.  The urinary bladder is decompressed although there is some perivesical fat stranding noted.     Bones:  Unremarkable.     Impression:  Large solid mass arising from the pelvis, likely uterine or ovarian in origin.  Differential diagnosis includes extremely large pedunculated fibroid with the possibility of malignant degeneration/leiomyosarcoma to be considered.  Solid ovarian neoplasm is also included in the differential diagnosis.     Nonspecific perivesicular fat stranding could indicate urinary bladder inflammation of any cause.

## 2022-08-14 NOTE — ASSESSMENT & PLAN NOTE
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 Low concern for PID, no vaginal discharge.   - Patient stable overall, no ongoing signs of infection/afebrile, will continue 10 day course of Augmentin on discharge

## 2022-08-14 NOTE — PLAN OF CARE
Plan of care reviewed. Patient is oriented X4. Ambulates without difficulty. PIV infusing LR @ 40 ml/hr. C/O H/A; PRN oxycodone administered. No vaginal bleeding at this time. T-max 99.8.  All questions and concerns addressed. Family remains at bedside. All safety precautions in place. Remains free of injury. Patient is stable. All needs met at this time.

## 2022-08-15 VITALS
SYSTOLIC BLOOD PRESSURE: 135 MMHG | TEMPERATURE: 99 F | DIASTOLIC BLOOD PRESSURE: 68 MMHG | HEIGHT: 63 IN | BODY MASS INDEX: 35.35 KG/M2 | RESPIRATION RATE: 16 BRPM | OXYGEN SATURATION: 96 % | HEART RATE: 84 BPM | WEIGHT: 199.5 LBS

## 2022-08-15 PROBLEM — R50.84: Status: ACTIVE | Noted: 2022-08-15

## 2022-08-15 PROBLEM — E66.9 OBESITY: Status: ACTIVE | Noted: 2022-08-15

## 2022-08-15 PROBLEM — R51.9 HEADACHE: Status: RESOLVED | Noted: 2022-08-14 | Resolved: 2022-08-15

## 2022-08-15 PROBLEM — R50.9 FEVER: Status: RESOLVED | Noted: 2022-08-11 | Resolved: 2022-08-15

## 2022-08-15 PROBLEM — D64.9 ANEMIA: Status: ACTIVE | Noted: 2022-08-15

## 2022-08-15 LAB
HAV IGM SERPL QL IA: NEGATIVE
HBV CORE IGM SERPL QL IA: NEGATIVE
HBV SURFACE AG SERPL QL IA: NEGATIVE
HCV AB SERPL QL IA: NEGATIVE

## 2022-08-15 PROCEDURE — 25000003 PHARM REV CODE 250: Performed by: STUDENT IN AN ORGANIZED HEALTH CARE EDUCATION/TRAINING PROGRAM

## 2022-08-15 PROCEDURE — 94799 UNLISTED PULMONARY SVC/PX: CPT

## 2022-08-15 PROCEDURE — 99238 HOSP IP/OBS DSCHRG MGMT 30/<: CPT | Mod: ,,, | Performed by: OBSTETRICS & GYNECOLOGY

## 2022-08-15 PROCEDURE — 99238 PR HOSPITAL DISCHARGE DAY,<30 MIN: ICD-10-PCS | Mod: ,,, | Performed by: OBSTETRICS & GYNECOLOGY

## 2022-08-15 PROCEDURE — 25000003 PHARM REV CODE 250: Performed by: OBSTETRICS & GYNECOLOGY

## 2022-08-15 PROCEDURE — 99900035 HC TECH TIME PER 15 MIN (STAT)

## 2022-08-15 RX ORDER — AMOXICILLIN AND CLAVULANATE POTASSIUM 875; 125 MG/1; MG/1
1 TABLET, FILM COATED ORAL EVERY 12 HOURS
Qty: 16 TABLET | Refills: 0 | Status: SHIPPED | OUTPATIENT
Start: 2022-08-15 | End: 2022-08-15 | Stop reason: SDUPTHER

## 2022-08-15 RX ORDER — ACETAMINOPHEN 325 MG/1
650 TABLET ORAL EVERY 6 HOURS
Qty: 240 TABLET | Refills: 0 | Status: SHIPPED | OUTPATIENT
Start: 2022-08-15 | End: 2022-09-14

## 2022-08-15 RX ORDER — FERROUS SULFATE 325(65) MG
325 TABLET, DELAYED RELEASE (ENTERIC COATED) ORAL DAILY
Qty: 30 TABLET | Refills: 3 | Status: SHIPPED | OUTPATIENT
Start: 2022-08-15 | End: 2022-09-14

## 2022-08-15 RX ORDER — AMOXICILLIN 250 MG
1 CAPSULE ORAL 2 TIMES DAILY
Qty: 30 TABLET | Refills: 1 | Status: SHIPPED | OUTPATIENT
Start: 2022-08-15

## 2022-08-15 RX ORDER — IBUPROFEN 600 MG/1
600 TABLET ORAL EVERY 6 HOURS
Qty: 120 TABLET | Refills: 0 | Status: SHIPPED | OUTPATIENT
Start: 2022-08-15 | End: 2022-09-14

## 2022-08-15 RX ORDER — OXYCODONE HYDROCHLORIDE 5 MG/1
5 TABLET ORAL EVERY 6 HOURS PRN
Qty: 10 TABLET | Refills: 0 | Status: ON HOLD | OUTPATIENT
Start: 2022-08-15 | End: 2022-09-19

## 2022-08-15 RX ORDER — AMOXICILLIN AND CLAVULANATE POTASSIUM 875; 125 MG/1; MG/1
1 TABLET, FILM COATED ORAL EVERY 12 HOURS
Qty: 17 TABLET | Refills: 0 | Status: SHIPPED | OUTPATIENT
Start: 2022-08-15 | End: 2022-08-24

## 2022-08-15 RX ADMIN — AMOXICILLIN AND CLAVULANATE POTASSIUM 1 TABLET: 875; 125 TABLET, FILM COATED ORAL at 08:08

## 2022-08-15 RX ADMIN — HYDROCORTISONE: 10 CREAM TOPICAL at 08:08

## 2022-08-15 RX ADMIN — OXYCODONE HYDROCHLORIDE 10 MG: 10 TABLET ORAL at 05:08

## 2022-08-15 RX ADMIN — FERROUS SULFATE TAB 325 MG (65 MG ELEMENTAL FE) 1 EACH: 325 (65 FE) TAB at 08:08

## 2022-08-15 RX ADMIN — MEDROXYPROGESTERONE ACETATE 10 MG: 10 TABLET ORAL at 08:08

## 2022-08-15 RX ADMIN — SENNOSIDES AND DOCUSATE SODIUM 1 TABLET: 50; 8.6 TABLET ORAL at 08:08

## 2022-08-15 NOTE — PLAN OF CARE
Patient continues on room air. Patient discharging home. PO abx given.  Discharge paperwork went over with patient. IV's taken out. Patient stable no acute changes.       Problem: Adult Inpatient Plan of Care  Goal: Plan of Care Review  Outcome: Met  Goal: Patient-Specific Goal (Individualized)  Outcome: Met  Goal: Absence of Hospital-Acquired Illness or Injury  Outcome: Met  Goal: Optimal Comfort and Wellbeing  Outcome: Met  Goal: Readiness for Transition of Care  Outcome: Met

## 2022-08-15 NOTE — DISCHARGE SUMMARY
[facility] - Oncology ([facility])  Gynecologic Oncology  Discharge Summary    Patient Name: Priyanka Raines  MRN: [id number]  Admission Date: 2022  Hospital Length of Stay: 4 days  Discharge Date and Time:  08/15/2022 8:38 AM  Attending Physician: Melvin Coffey MD   Discharging Provider: Florencia Narvaez MD  Primary Care Provider: Primary Doctor No    HPI:   Priyanka Raines is a 57 y.o.  presents as transfer from OSH in the setting of vaginal bleeding, pelvic pain, and fevers. Patient had been admitted twice in the setting of vaginal bleeding and was thought to be secondary to uterine fibroids. Patient then developed isolated fevers for which blood and urine cultures were obtained, and she was started on antibiotics (unclear which antibiotic therapy was started). Review of outside records reveals normal daily CBC (no WBC), TVUS showed enlarged uterus measuring 15.6 x 6.7 x 10.9 cm with enlarged midline mass measuring 7.3 x 8.5 cm likely pedunculated fibroid or ovarian dermoid, tumor markers drawn, and endometrial biopsy completed with sound to 10 cm, however results unavailable.     On arrival to [facility], patient had isolated fever 101.4, , BP wnl, 94% on RA. She was started on IV Rocephin, fluid bolus and STAT labs ordered.    On evaluation, patient resting comfortably in bed. Able to converse in complete sentences. She is accompanied by her  and son. She reports mild pelvic cramping and light vaginal spotting. She denies nausea/vomiting, constipation, diarrhea, dysuria, CP, or SOB.    Patient reports long standing history of PMB. She previously had AUB with menstrual cycles and is s/p endometrial ablation greater than 15 years ago. She noticed spotting recently for which she underwent endometrial biopsies x2 which were negative for malignancy per patient report. She was started on Provera with control of bleeding. She has known history of uterine fibroids. Patient reports this past week she had an  episode of bleeding and associated pain which prompted evaluation in the ED.      Ob/Gyn history  ,  x1  H/o of endometrial ablation in the setting of AUB approximately 15 yo  Denies history of STDs  Receives routine Ob/Gyn care at [facility]. Last pap smear one year ago and normal per patient report  Currently sexually active with one male partner  Menopause age 54  Cousin with h/o breast cancer, no first degree relatives with history of breast/Gyn cancer          Hospital Course:  2022 Transfer from OSH in the setting of vaginal bleeding, pelvic pain, and fevers. On arrival, patient febrile to 101.4, afebrile s/p administration of Tylenol. IVF started. CBC/CMP obtained. Patient reports light vaginal spotting. Last BM this AM. Reports mild abdominal cramping but denies severe pain. UA negative nitrites/leuks. Blood and urine culture ordered however s/p antibiotics at OSH. Rocephin x1 administered in the setting of fever. CBC/CMP pending. Plan for supportive care with pain control and IV Abx.   2022 - AM H/H roughly stable @ 6.9/21. However patient now symptomatic so will transfuse. Patient remains asymptomatic. Continued mild range fevers overnight, Rocephin discontinued & Zosyn initiated. Continue supportive care with pain control & IV Abx.  2022 AM Hgb improved after 1upRBC @ 7.5, asymptomatic > repeat 1u pRBC. Fever overnight 103.4 at end of blood transfusion, resolved 100.1 with Tylenol. No other signs of infection. Continuing Zoysn, pre treating transfusion with Benadryl and Tylenol. CT AP 20cm fibroid. DC Toradol  > ibuprofen. DC Tylenol PRN, nursing to call if any fevers. CXR and EKG for pre op clearance. PM CBC and BMP. Starting prophylactic Lovenox.   2022 Hgb 9.7 > 8.6, overall stable. Cr at baseline 1.0 > 0.8. Afebrile x 36H, d/c IV Zosyn and transition PO augmentin. D/c ibuprofen. Will monitor closely for fevers. Discussed possible discharge vs surgery, will f/u with   [facility].   08/15/2022 Afebrile x48H. Receiving PO augmentin and oxycodone 5/10 PRN for pain. Patient to be discharged and scheduled for outpatient WEN.     On day of discharge, afebrile x48H and transitioned to PO antibiotics. Pain controlled on PO pain medications. Discussed with Dr Coffey patient to be scheduled for surgery (WEN). Return precautions given.     Goals of Care Treatment Preferences:  Code Status: Full Code      * No surgery found *     Consults (From admission, onward)        Status Ordering Provider     Inpatient consult to Midline team  Once        Provider:  (Not yet assigned)    ALOK Lopez          Significant Diagnostic Studies: Labs:   CBC   Recent Labs   Lab 08/13/22  1855 08/14/22  0603   WBC 7.53  --    HGB 9.7* 8.6*   HCT 29.4*  --      --      Radiology: CT scan: CT ABDOMEN PELVIS WITH CONTRAST:   Results for orders placed or performed during the hospital encounter of 08/11/22   CT Abdomen Pelvis With Contrast    Narrative    EXAMINATION:  CT ABDOMEN PELVIS WITH CONTRAST    CLINICAL HISTORY:  Abdominal abscess/infection suspected;    TECHNIQUE:  Low dose axial images, sagittal and coronal reformations were obtained from the lung bases to the pubic symphysis following the IV administration of 100 mL of Omnipaque 350    COMPARISON:  None.    FINDINGS:  Abdomen:    - Lung bases: Clear.    - Liver: Subcentimeter low-density lesion in the left lobe not able to be characterized with certainty but statistically small cyst or hemangioma.    - Gallbladder and bile ducts: Unremarkable.    - Spleen: Mildly enlarged with granulomatous calcifications.    - Pancreas: Normal.    - Kidneys: No mass or hydronephrosis.    - Adrenals: Unremarkable.    - Retroperitoneum:  No significant adenopathy.    - Vascular: Unremarkable.    - Bowel/mesentery: Unremarkable.    Pelvis:    The uterus is enlarged with multiple fibroids.  Arising from the central pelvis is a large 20 by 14 x 10 cm  mass that extends cephalad to the level of the umbilicus.  The mass is mildly heterogeneous but solid with no significant cystic components.  No definite fat plane exists between the uterus and this mass and normal ovarian tissue is difficult to appreciate with certainty.  There is no evidence of pelvic ascites.  The urinary bladder is decompressed although there is some perivesical fat stranding noted.    Bones:  Unremarkable.      Impression    Large solid mass arising from the pelvis, likely uterine or ovarian in origin.  Differential diagnosis includes extremely large pedunculated fibroid with the possibility of malignant degeneration/leiomyosarcoma to be considered.  Solid ovarian neoplasm is also included in the differential diagnosis.    Nonspecific perivesicular fat stranding could indicate urinary bladder inflammation of any cause.      Electronically signed by: Fred Plunkett Jr  Date:    08/13/2022  Time:    08:29       Pending Diagnostic Studies:     Procedure Component Value Units Date/Time    Hepatitis panel, acute [[id number]] Collected: 08/12/22 0318    Order Status: Sent Lab Status: In process Updated: 08/12/22 0319    Specimen: Blood         Final Active Diagnoses:    Diagnosis Date Noted POA    PRINCIPAL PROBLEM:  Post-menopausal bleeding [N95.0] 08/11/2022 Yes    Fibroids [D21.9] 08/12/2022 Yes    Febrile transfusion reaction [R50.84] 08/15/2022 Unknown    Anemia [D64.9] 08/15/2022 Yes    Obesity [E66.9] 08/15/2022 Yes    Transaminitis [R74.01] 08/12/2022 Yes      Problems Resolved During this Admission:    Diagnosis Date Noted Date Resolved POA    Fever [R50.9] 08/11/2022 08/15/2022 Yes    Headache [R51.9] 08/14/2022 08/15/2022 No        Does this patient meet criteria for extended DVT prophylaxis? No, because no diagnosis of cancer    Discharged Condition: good    Disposition: Home or Self Care    Follow Up:   Follow-up Information     Melvin Coffey MD Follow up.    Specialty:  Gynecologic Oncology  Why: If symptoms worsen  Contact information:  [address] [address]  [phone]                       Patient Instructions:      Diet general     Lifting restrictions   Order Comments: No lifting greater than 15 pounds for six weeks.     Call MD for:  temperature >100.4     Call MD for:  persistent nausea and vomiting     Call MD for:  severe uncontrolled pain     Call MD for:  difficulty breathing, headache or visual disturbances     Call MD for:  hives     Call MD for:   Order Comments: inability to void,urine is ketchup colored or you have large clots, vaginal bleeding is heavier than a period.    VAGINAL DISCHARGE: You may develop a vaginal discharge and intermittent vaginal spotting after surgery and up to 6 weeks postoperatively.  The discharge may have an odor and may change in color but it is normal.  This is due to dissolving stiches.  Contact your surgical team if you develop vaginal or vulvar irritation along with a discharge.  Also contact your surgical team if you have vaginal discharge that smells like urine or stool.    CONSTIPATION REMEDIES: Patients are often constipated after surgery or with use of oral narcotic medicine. You should continue to take the stool softener, Senokot-S during the next six weeks, and consume adequate amounts of water.  If you have not had a bowel movement for 3 days after dismissal, or are uncomfortable and unable to pass stool, please try one or all of the following measures:  1.  Milk of Magnesia - 30 cc by mouth every 12 hours   2.  Dulcolax suppository - One suppository per rectum every 4-6 hours   3.  Metamucil, Fibercon or other bulk former - use as directed  4.  Fleets Enema        PAIN MEDICATIONS:     Take your pain medications as instructed. It is best to take pain medications before your pain becomes severe. This will allow you to take less medication yet have better pain relief. For the first 2 or 3 days it  may be helpful to take your pain medications on a regular schedule (e.g. every 4 to 6 hours). This will help you to keep your pain under better control. You should then begin to take fewer medications each day until you no longer need them. Do not take pain medication on an empty stomach. This may lead to nausea and vomiting.     Other restrictions (specify):   Order Comments: DRIVING:  No driving while on narcotics. Driving may be resumed initially with a competent passenger one to two weeks after surgery if no longer taking narcotics.     Call MD for:  persistent dizziness or light-headedness     Activity as tolerated   Order Comments: Return to normal activity slowly as you feel able.  For 6 weeks your exercise should be limited to walking.  You may walk as far as you wish, as long as you increase your level of exertion gradually and avoid slippery surfaces.    If you had a hysterectomy at the surgery do not insert anything in your vagina for 9 weeks.     Shower on day dressing removed (No bath)   Order Comments: You may shower at any time but should avoid immersing any abdominal incisions in water for at least 2 weeks after surgery or until the wound is completely healed.  If given, please shower with Hibaclens soap until bottle is completely finish     Medications:  Reconciled Home Medications:      Medication List      START taking these medications    acetaminophen 325 MG tablet  Commonly known as: TYLENOL  Take 2 tablets (650 mg total) by mouth every 6 (six) hours. Alternate between ibuprofen and tylenol every 3 hours. For example: @0800: ibuprofen 600mg @1100: tylenol 650mg @1400: ibuprofen 600mg @1700: tylenol 650 mg @2000: ibuprofen 600mg     amoxicillin-clavulanate 875-125mg 875-125 mg per tablet  Commonly known as: AUGMENTIN  Take 1 tablet by mouth every 12 (twelve) hours. for 17 doses     ferrous sulfate 325 (65 FE) MG EC tablet  Take 1 tablet (325 mg total) by mouth once daily.     ibuprofen 600 MG  tablet  Commonly known as: ADVIL,MOTRIN  Take 1 tablet  by mouth every 6 hours. Alternate between ibuprofen and tylenol every 3 hours. For example: @0800: ibuprofen 600mg @1100: tylenol 650mg @1400: ibuprofen 600mg @1700: tylenol 650 mg @2000: ibuprofen 600mg     oxyCODONE 5 MG immediate release tablet  Commonly known as: ROXICODONE  Take 1 tablet (5 mg total) by mouth every 6 (six) hours as needed for Pain.     senna-docusate 8.6-50 mg 8.6-50 mg per tablet  Commonly known as: PERICOLACE  Take 1 tablet by mouth 2 (two) times daily.            Florencia Narvaez MD  Gynecologic Oncology  [facility] - Oncology [facility])

## 2022-08-15 NOTE — PLAN OF CARE
Plan of care reviewed. Patient is oriented X4. Ambulates without difficulty. PIV flushed and saline locked. C/O H/A; PRN oxycodone administered. No reports of vaginal bleeding. T-max 99.8.  All questions and concerns addressed. Family remains at bedside. All safety precautions in place. Remains free of injury. Patient is stable. All needs met at this time.

## 2022-08-15 NOTE — SUBJECTIVE & OBJECTIVE
Interval History: NAEON. Patient denies any fevers chill. Pain well controlled on PRN oxycodone, requested 5mg and 10mg overnight. Ambulating, tolerating PO, voiding spontaneously. Endorses 2 loose stools yesterday, occasionally uses stool softeners at baseline.     Scheduled Meds:   amoxicillin-clavulanate 875-125mg  1 tablet Oral Q12H    enoxaparin  40 mg Subcutaneous Daily    ferrous sulfate  1 tablet Oral Daily    hydrocortisone   Topical (Top) BID    medroxyPROGESTERone  10 mg Oral Daily    senna-docusate 8.6-50 mg  1 tablet Oral BID     Continuous Infusions:  PRN Meds:sodium chloride, sodium chloride, calcium carbonate, diphenhydrAMINE, hydrALAZINE, melatonin, metoclopramide HCl, ondansetron, oxyCODONE, oxyCODONE, polyethylene glycol, simethicone, sodium chloride 0.9%    Review of patient's allergies indicates:  No Known Allergies    Objective:     Vital Signs (Most Recent):  Temp: 99.8 °F (37.7 °C) (08/15/22 0435)  Pulse: 90 (08/15/22 0435)  Resp: 18 (08/15/22 0547)  BP: 131/61 (08/15/22 0435)  SpO2: 96 % (08/15/22 0435) Vital Signs (24h Range):  Temp:  [98.2 °F (36.8 °C)-99.8 °F (37.7 °C)] 99.8 °F (37.7 °C)  Pulse:  [77-90] 90  Resp:  [16-18] 18  SpO2:  [96 %-99 %] 96 %  BP: (124-137)/(56-74) 131/61     Weight: 90.5 kg (199 lb 8.3 oz)  Body mass index is 35.34 kg/m².    Intake/Output - Last 3 Shifts         08/13 0700  08/14 0659 08/14 0700  08/15 0659    P.O. 600 480    I.V. (mL/kg)  794.9 (8.8)    Blood 422.1     IV Piggyback  587.6    Total Intake(mL/kg) 1022.1 (11.3) 1862.5 (20.6)    Urine (mL/kg/hr) 490 (0.2) 1500 (0.7)    Total Output 490 1500    Net +532.1 +362.5          Urine Occurrence 701 x     Stool Occurrence 0 x                Physical Exam:   Constitutional: She is oriented to person, place, and time. She appears well-developed and well-nourished. No distress.    HENT:   Head: Normocephalic and atraumatic.    Eyes: EOM are normal.     Cardiovascular:  Normal rate and intact distal pulses.       Exam reveals no edema.        Pulmonary/Chest: Effort normal and breath sounds normal. No respiratory distress.        Abdominal: Soft. She exhibits no distension. There is no abdominal tenderness.             Musculoskeletal: Normal range of motion and moves all extremeties. No tenderness or edema.       Neurological: She is alert and oriented to person, place, and time.    Skin: Skin is warm and dry. No erythema. No pallor.    Psychiatric: She has a normal mood and affect.     Lines/Drains/Airways       Peripheral Intravenous Line  Duration                  Peripheral IV - Single Lumen 08/12/22 1523 20 G;1 3/4 in Left Forearm 2 days         Peripheral IV - Single Lumen 08/12/22 1523 20 G;1 3/4 in Left Upper Arm 2 days                    Laboratory:  Recent Lab Results       None            Diagnostic Results:  Labs: Reviewed

## 2022-08-15 NOTE — DISCHARGE SUMMARY
[facility] - Oncology ([facility])  Gynecologic Oncology  Discharge Summary    Patient Name: Priyanka Raines  MRN: [id number]  Admission Date: 2022  Hospital Length of Stay: 4 days  Discharge Date and Time:  08/15/2022 8:33 AM  Attending Physician: Melvin Coffey MD   Discharging Provider: Florencia Narvaez MD  Primary Care Provider: Primary Doctor No    HPI:   Priyanka Raines is a 57 y.o.  presents as transfer from OSH in the setting of vaginal bleeding, pelvic pain, and fevers. Patient had been admitted twice in the setting of vaginal bleeding and was thought to be secondary to uterine fibroids. Patient then developed isolated fevers for which blood and urine cultures were obtained, and she was started on antibiotics (unclear which antibiotic therapy was started). Review of outside records reveals normal daily CBC (no WBC), TVUS showed enlarged uterus measuring 15.6 x 6.7 x 10.9 cm with enlarged midline mass measuring 7.3 x 8.5 cm likely pedunculated fibroid or ovarian dermoid, tumor markers drawn, and endometrial biopsy completed with sound to 10 cm, however results unavailable.     On arrival to [facility], patient had isolated fever 101.4, , BP wnl, 94% on RA. She was started on IV Rocephin, fluid bolus and STAT labs ordered.    On evaluation, patient resting comfortably in bed. Able to converse in complete sentences. She is accompanied by her  and son. She reports mild pelvic cramping and light vaginal spotting. She denies nausea/vomiting, constipation, diarrhea, dysuria, CP, or SOB.    Patient reports long standing history of PMB. She previously had AUB with menstrual cycles and is s/p endometrial ablation greater than 15 years ago. She noticed spotting recently for which she underwent endometrial biopsies x2 which were negative for malignancy per patient report. She was started on Provera with control of bleeding. She has known history of uterine fibroids. Patient reports this past week she had an  episode of bleeding and associated pain which prompted evaluation in the ED.      Ob/Gyn history  ,  x1  H/o of endometrial ablation in the setting of AUB approximately 15 yo  Denies history of STDs  Receives routine Ob/Gyn care at [facility]. Last pap smear one year ago and normal per patient report  Currently sexually active with one male partner  Menopause age 54  Cousin with h/o breast cancer, no first degree relatives with history of breast/Gyn cancer          Hospital Course:  2022 Transfer from OSH in the setting of vaginal bleeding, pelvic pain, and fevers. On arrival, patient febrile to 101.4, afebrile s/p administration of Tylenol. IVF started. CBC/CMP obtained. Patient reports light vaginal spotting. Last BM this AM. Reports mild abdominal cramping but denies severe pain. UA negative nitrites/leuks. Blood and urine culture ordered however s/p antibiotics at OSH. Rocephin x1 administered in the setting of fever. CBC/CMP pending. Plan for supportive care with pain control and IV Abx.   2022 - AM H/H roughly stable @ 6.9/21. However patient now symptomatic so will transfuse. Patient remains asymptomatic. Continued mild range fevers overnight, Rocephin discontinued & Zosyn initiated. Continue supportive care with pain control & IV Abx.  2022 AM Hgb improved after 1upRBC @ 7.5, asymptomatic > repeat 1u pRBC. Fever overnight 103.4 at end of blood transfusion, resolved 100.1 with Tylenol. No other signs of infection. Continuing Zoysn, pre treating transfusion with Benadryl and Tylenol. CT AP 20cm fibroid. DC Toradol  > ibuprofen. DC Tylenol PRN, nursing to call if any fevers. CXR and EKG for pre op clearance. PM CBC and BMP. Starting prophylactic Lovenox.   2022 Hgb 9.7 > 8.6, overall stable. Cr at baseline 1.0 > 0.8. Afebrile x 36H, d/c IV Zosyn and transition PO augmentin. D/c ibuprofen. Will monitor closely for fevers. Discussed possible discharge vs surgery, will f/u with   Alex[facility].   08/15/2022 Afebrile x48H. Receiving PO augmentin and oxycodone 5/10 PRN for pain. Patient to be discharged and scheduled for outpatient WEN.       On day of discharge, afebrile x48H and transitioned to PO antibiotics. Pain controlled on PO pain medications. Discussed with Dr Jenkins patient to be scheduled for surgery (WEN). Return precautions given.     Goals of Care Treatment Preferences:  Code Status: Full Code      * No surgery found *     Consults (From admission, onward)        Status Ordering Provider     Inpatient consult to Midline team  Once        Provider:  (Not yet assigned)    Completed ALOK JENKINS          Significant Diagnostic Studies: Labs:   CBC   Recent Labs   Lab 08/13/22  1855 08/14/22  0603   WBC 7.53  --    HGB 9.7* 8.6*   HCT 29.4*  --      --      Microbiology:   Blood Culture   Lab Results   Component Value Date    LABBLOO No Growth to date 08/12/2022    LABBLOO No Growth to date 08/12/2022    LABBLOO No Growth to date 08/12/2022    and Urine Culture    Lab Results   Component Value Date    LABURIN No growth 08/11/2022     Radiology: X-Ray: CXR: X-Ray Chest PA and Lateral (CXR): No results found for this visit on 08/11/22.  CT scan: CT ABDOMEN PELVIS WITH CONTRAST:   Results for orders placed or performed during the hospital encounter of 08/11/22   CT Abdomen Pelvis With Contrast    Narrative    EXAMINATION:  CT ABDOMEN PELVIS WITH CONTRAST    CLINICAL HISTORY:  Abdominal abscess/infection suspected;    TECHNIQUE:  Low dose axial images, sagittal and coronal reformations were obtained from the lung bases to the pubic symphysis following the IV administration of 100 mL of Omnipaque 350    COMPARISON:  None.    FINDINGS:  Abdomen:    - Lung bases: Clear.    - Liver: Subcentimeter low-density lesion in the left lobe not able to be characterized with certainty but statistically small cyst or hemangioma.    - Gallbladder and bile ducts: Unremarkable.    - Spleen: Mildly  enlarged with granulomatous calcifications.    - Pancreas: Normal.    - Kidneys: No mass or hydronephrosis.    - Adrenals: Unremarkable.    - Retroperitoneum:  No significant adenopathy.    - Vascular: Unremarkable.    - Bowel/mesentery: Unremarkable.    Pelvis:    The uterus is enlarged with multiple fibroids.  Arising from the central pelvis is a large 20 by 14 x 10 cm mass that extends cephalad to the level of the umbilicus.  The mass is mildly heterogeneous but solid with no significant cystic components.  No definite fat plane exists between the uterus and this mass and normal ovarian tissue is difficult to appreciate with certainty.  There is no evidence of pelvic ascites.  The urinary bladder is decompressed although there is some perivesical fat stranding noted.    Bones:  Unremarkable.      Impression    Large solid mass arising from the pelvis, likely uterine or ovarian in origin.  Differential diagnosis includes extremely large pedunculated fibroid with the possibility of malignant degeneration/leiomyosarcoma to be considered.  Solid ovarian neoplasm is also included in the differential diagnosis.    Nonspecific perivesicular fat stranding could indicate urinary bladder inflammation of any cause.      Electronically signed by: Fred Plunkett Jr  Date:    08/13/2022  Time:    08:29     Cardiac Graphics:    Pending Diagnostic Studies:     Procedure Component Value Units Date/Time    Hepatitis panel, acute [[id number]] Collected: 08/12/22 0318    Order Status: Sent Lab Status: In process Updated: 08/12/22 0319    Specimen: Blood         Final Active Diagnoses:    Diagnosis Date Noted POA    PRINCIPAL PROBLEM:  Post-menopausal bleeding [N95.0] 08/11/2022 Yes    Fibroids [D21.9] 08/12/2022 Yes    Febrile transfusion reaction [R50.84] 08/15/2022 Unknown    Anemia [D64.9] 08/15/2022 Yes    Obesity [E66.9] 08/15/2022 Yes    Transaminitis [R74.01] 08/12/2022 Yes      Problems Resolved During this  Admission:    Diagnosis Date Noted Date Resolved POA    Fever [R50.9] 08/11/2022 08/15/2022 Yes    Headache [R51.9] 08/14/2022 08/15/2022 No        Does this patient meet criteria for extended DVT prophylaxis? No, because no cancer diagnosis    Discharged Condition: good    Disposition: Home or Self Care    Follow Up:   Follow-up Information     Melvin Coffey MD Follow up.    Specialty: Gynecologic Oncology  Why: If symptoms worsen  Contact information:  [address] [place] [id number]  [phone]                       Patient Instructions:      Diet general     Lifting restrictions   Order Comments: No lifting greater than 15 pounds for six weeks.     Call MD for:  temperature >100.4     Call MD for:  persistent nausea and vomiting     Call MD for:  severe uncontrolled pain     Call MD for:  difficulty breathing, headache or visual disturbances     Call MD for:  hives     Call MD for:   Order Comments: inability to void,urine is ketchup colored or you have large clots, vaginal bleeding is heavier than a period.    VAGINAL DISCHARGE: You may develop a vaginal discharge and intermittent vaginal spotting after surgery and up to 6 weeks postoperatively.  The discharge may have an odor and may change in color but it is normal.  This is due to dissolving stiches.  Contact your surgical team if you develop vaginal or vulvar irritation along with a discharge.  Also contact your surgical team if you have vaginal discharge that smells like urine or stool.    CONSTIPATION REMEDIES: Patients are often constipated after surgery or with use of oral narcotic medicine. You should continue to take the stool softener, Senokot-S during the next six weeks, and consume adequate amounts of water.  If you have not had a bowel movement for 3 days after dismissal, or are uncomfortable and unable to pass stool, please try one or all of the following measures:  1.  Milk of Magnesia - 30 cc by mouth every 12 hours    2.  Dulcolax suppository - One suppository per rectum every 4-6 hours   3.  Metamucil, Fibercon or other bulk former - use as directed  4.  Fleets Enema        PAIN MEDICATIONS:     Take your pain medications as instructed. It is best to take pain medications before your pain becomes severe. This will allow you to take less medication yet have better pain relief. For the first 2 or 3 days it may be helpful to take your pain medications on a regular schedule (e.g. every 4 to 6 hours). This will help you to keep your pain under better control. You should then begin to take fewer medications each day until you no longer need them. Do not take pain medication on an empty stomach. This may lead to nausea and vomiting.     Other restrictions (specify):   Order Comments: DRIVING:  No driving while on narcotics. Driving may be resumed initially with a competent passenger one to two weeks after surgery if no longer taking narcotics.     Call MD for:  persistent dizziness or light-headedness     Activity as tolerated   Order Comments: Return to normal activity slowly as you feel able.  For 6 weeks your exercise should be limited to walking.  You may walk as far as you wish, as long as you increase your level of exertion gradually and avoid slippery surfaces.    If you had a hysterectomy at the surgery do not insert anything in your vagina for 9 weeks.     Shower on day dressing removed (No bath)   Order Comments: You may shower at any time but should avoid immersing any abdominal incisions in water for at least 2 weeks after surgery or until the wound is completely healed.  If given, please shower with Hibaclens soap until bottle is completely finish     Medications:  Reconciled Home Medications:      Medication List      START taking these medications    acetaminophen 325 MG tablet  Commonly known as: TYLENOL  Take 2 tablets (650 mg total) by mouth every 6 (six) hours. Alternate between ibuprofen and tylenol every 3 hours.  For example: @0800: ibuprofen 600mg @1100: tylenol 650mg @1400: ibuprofen 600mg @1700: tylenol 650 mg @2000: ibuprofen 600mg     amoxicillin-clavulanate 875-125mg 875-125 mg per tablet  Commonly known as: AUGMENTIN  Take 1 tablet by mouth every 12 (twelve) hours. for 8 days     ferrous sulfate 325 (65 FE) MG EC tablet  Take 1 tablet (325 mg total) by mouth once daily.     ibuprofen 600 MG tablet  Commonly known as: ADVIL,MOTRIN  Take 1 tablet  by mouth every 6 hours. Alternate between ibuprofen and tylenol every 3 hours. For example: @0800: ibuprofen 600mg @1100: tylenol 650mg @1400: ibuprofen 600mg @1700: tylenol 650 mg @2000: ibuprofen 600mg     oxyCODONE 5 MG immediate release tablet  Commonly known as: ROXICODONE  Take 1 tablet (5 mg total) by mouth every 6 (six) hours as needed for Pain.     senna-docusate 8.6-50 mg 8.6-50 mg per tablet  Commonly known as: PERICOLACE  Take 1 tablet by mouth 2 (two) times daily.            Florencia Narvaez MD  Gynecologic Oncology  [facility] - On[facility])

## 2022-08-15 NOTE — ASSESSMENT & PLAN NOTE
- New HA this AM unresponsive to ibuprofen, likely secondary to anemia > Tylenol 1000mg > HA resolved

## 2022-08-15 NOTE — PROGRESS NOTES
[facility] - Oncology ([facility])  Gynecologic Oncology  Progress Note      Patient Name: Priyanka Raines  MRN: [id number]  Admission Date: 2022  Hospital Length of Stay: 4 days  Attending Provider: Melvin Coffey MD  Primary Care Provider: Primary Doctor No  Principal Problem: Post-menopausal bleeding    Follow-up For: * No surgery found *  Post-Operative Day:    Subjective:      History of Present Illness:  Priyanka Raines is a 57 y.o.  presents as transfer from OSH in the setting of vaginal bleeding, pelvic pain, and fevers. Patient had been admitted twice in the setting of vaginal bleeding and was thought to be secondary to uterine fibroids. Patient then developed isolated fevers for which blood and urine cultures were obtained, and she was started on antibiotics (unclear which antibiotic therapy was started). Review of outside records reveals normal daily CBC (no WBC), TVUS showed enlarged uterus measuring 15.6 x 6.7 x 10.9 cm with enlarged midline mass measuring 7.3 x 8.5 cm likely pedunculated fibroid or ovarian dermoid, tumor markers drawn, and endometrial biopsy completed with sound to 10 cm, however results unavailable.     On arrival to [facility], patient had isolated fever 101.4, , BP wnl, 94% on RA. She was started on IV Rocephin, fluid bolus and STAT labs ordered.    On evaluation, patient resting comfortably in bed. Able to converse in complete sentences. She is accompanied by her  and son. She reports mild pelvic cramping and light vaginal spotting. She denies nausea/vomiting, constipation, diarrhea, dysuria, CP, or SOB.    Patient reports long standing history of PMB. She previously had AUB with menstrual cycles and is s/p endometrial ablation greater than 15 years ago. She noticed spotting recently for which she underwent endometrial biopsies x2 which were negative for malignancy per patient report. She was started on Provera with control of bleeding. She has known history of  uterine fibroids. Patient reports this past week she had an episode of bleeding and associated pain which prompted evaluation in the ED.      Ob/Gyn history  ,  x1  H/o of endometrial ablation in the setting of AUB approximately 15 yo  Denies history of STDs  Receives routine Ob/Gyn care at [facility]. Last pap smear one year ago and normal per patient report  Currently sexually active with one male partner  Menopause age 54  Cousin with h/o breast cancer, no first degree relatives with history of breast/Gyn cancer          Hospital Course:  2022 Transfer from OSH in the setting of vaginal bleeding, pelvic pain, and fevers. On arrival, patient febrile to 101.4, afebrile s/p administration of Tylenol. IVF started. CBC/CMP obtained. Patient reports light vaginal spotting. Last BM this AM. Reports mild abdominal cramping but denies severe pain. UA negative nitrites/leuks. Blood and urine culture ordered however s/p antibiotics at OSH. Rocephin x1 administered in the setting of fever. CBC/CMP pending. Plan for supportive care with pain control and IV Abx.   2022 - AM H/H roughly stable @ 6.9/21. However patient now symptomatic so will transfuse. Patient remains asymptomatic. Continued mild range fevers overnight, Rocephin discontinued & Zosyn initiated. Continue supportive care with pain control & IV Abx.  2022 AM Hgb improved after 1upRBC @ 7.5, asymptomatic > repeat 1u pRBC. Fever overnight 103.4 at end of blood transfusion, resolved 100.1 with Tylenol. No other signs of infection. Continuing Zoysn, pre treating transfusion with Benadryl and Tylenol. CT AP 20cm fibroid. DC Toradol  > ibuprofen. DC Tylenol PRN, nursing to call if any fevers. CXR and EKG for pre op clearance. PM CBC and BMP. Starting prophylactic Lovenox.   2022 Hgb 9.7 > 8.6, overall stable. Cr at baseline 1.0 > 0.8. Afebrile x 36H, d/c IV Zosyn and transition PO augmentin. D/c ibuprofen. Will monitor closely for fevers.  Discussed possible discharge vs surgery, will f/u with Dr Coffey.   08/15/2022 Afebrile x48H. Receiving PO augmentin and oxycodone 5/10 PRN for pain. Patient to be discharged and scheduled for outpatient WEN.       Interval History: NAEON. Patient denies any fevers chill. Pain well controlled on PRN oxycodone, requested 5mg and 10mg overnight. Ambulating, tolerating PO, voiding spontaneously. Endorses 2 loose stools yesterday, occasionally uses stool softeners at baseline.     Scheduled Meds:   amoxicillin-clavulanate 875-125mg  1 tablet Oral Q12H    enoxaparin  40 mg Subcutaneous Daily    ferrous sulfate  1 tablet Oral Daily    hydrocortisone   Topical (Top) BID    medroxyPROGESTERone  10 mg Oral Daily    senna-docusate 8.6-50 mg  1 tablet Oral BID     Continuous Infusions:  PRN Meds:sodium chloride, sodium chloride, calcium carbonate, diphenhydrAMINE, hydrALAZINE, melatonin, metoclopramide HCl, ondansetron, oxyCODONE, oxyCODONE, polyethylene glycol, simethicone, sodium chloride 0.9%    Review of patient's allergies indicates:  No Known Allergies    Objective:     Vital Signs (Most Recent):  Temp: 99.8 °F (37.7 °C) (08/15/22 0435)  Pulse: 90 (08/15/22 0435)  Resp: 18 (08/15/22 0547)  BP: 131/61 (08/15/22 0435)  SpO2: 96 % (08/15/22 0435) Vital Signs (24h Range):  Temp:  [98.2 °F (36.8 °C)-99.8 °F (37.7 °C)] 99.8 °F (37.7 °C)  Pulse:  [77-90] 90  Resp:  [16-18] 18  SpO2:  [96 %-99 %] 96 %  BP: (124-137)/(56-74) 131/61     Weight: 90.5 kg (199 lb 8.3 oz)  Body mass index is 35.34 kg/m².    Intake/Output - Last 3 Shifts         08/13 0700  08/14 0659 08/14 0700  08/15 0659    P.O. 600 480    I.V. (mL/kg)  794.9 (8.8)    Blood 422.1     IV Piggyback  587.6    Total Intake(mL/kg) 1022.1 (11.3) 1862.5 (20.6)    Urine (mL/kg/hr) 490 (0.2) 1500 (0.7)    Total Output 490 1500    Net +532.1 +362.5          Urine Occurrence 701 x     Stool Occurrence 0 x                Physical Exam:   Constitutional: She is oriented  to person, place, and time. She appears well-developed and well-nourished. No distress.    HENT:   Head: Normocephalic and atraumatic.    Eyes: EOM are normal.     Cardiovascular:  Normal rate and intact distal pulses.      Exam reveals no edema.        Pulmonary/Chest: Effort normal and breath sounds normal. No respiratory distress.        Abdominal: Soft. She exhibits no distension. There is no abdominal tenderness.             Musculoskeletal: Normal range of motion and moves all extremeties. No tenderness or edema.       Neurological: She is alert and oriented to person, place, and time.    Skin: Skin is warm and dry. No erythema. No pallor.    Psychiatric: She has a normal mood and affect.     Lines/Drains/Airways       Peripheral Intravenous Line  Duration                  Peripheral IV - Single Lumen 08/12/22 1523 20 G;1 3/4 in Left Forearm 2 days         Peripheral IV - Single Lumen 08/12/22 1523 20 G;1 3/4 in Left Upper Arm 2 days                    Laboratory:  Recent Lab Results       None            Diagnostic Results:  Labs: Reviewed    Assessment/Plan:     * Post-menopausal bleeding  - s/p Endometrial ablation circa [id number]  - Patient reports PMB beginning approximately one year ago with negative endometrial biopsies and controlled with provera  - Patient then presented with acute vaginal bleeding with associated pain and fevers and [place] [facility]  - TVUS consistent with large fibroid uterus, including possible fundal/pedunculated fibroid  - Tumor markers obtained at OSH,  here elevated   - Inhibin A/B slightly elevated 24, LDH elevated   - AFP, bHcG, CEA, CA 19-9 WNL    - Pattern not consistent with ovarian tumor more likely secondary to necrotizing tissue  - Endometrial biopsy performed at OSH, pathology pending  - Bleeding likely secondary to uterine fibroids, however Embx pending  - Continue home Provera 10 mg  - Continue OXY IR PRN for pain, d/c ibuprofen   - Bleeding improved, H/H  stable at 6.9/21; patient now with symptoms of anemia > 1u pRBC via midline catheter > Hgb 7.5 . 1u pRBC > Hgb 9.7 > 8.6  - Cr elevated above baseline likely secondary to bleeding 0.8 > 1.0 > 1.0 > 0.8 after 2u pRBC and mIVF; will d/c mIVF as tolerating PO   - CT CAP 20cm mass differential pedunculated fibroid with the possibility of malignant degeneration/leiomyosarcoma to be considered.  Solid ovarian neoplasm is also included in the differential diagnosis   - Lovenox prophylaxis initiated 8/13 for inpatient care only   - Schedule for outpatient WEN with Dr Coffey     Fever  - Isolated 101.4 temperature initially on admission, now with recurrent low grade fevers overnight of 100.4F, overnight fever 103.4 @ 1830 8/12 resolved to 100.1 with Tylenol, afebrile since    - Nursing communication placed to please call/page to notify if fevers present prior to giving Tylenol; d/c PRN Tylenol   - S/p Rocephin (8/11), Zosyn (8/12-8/14) > PO Augmentin (8/14-8/24)   - UA negative, urine culture no growth, blood cultures #1 NGTD however obtained s/p antibiotic treatment at OSH; blood cultures #2 after Zosyn initiated NGTD   - Fevers likely secondary to uterine fibroid necrosis, will continue to look for signs/symptoms of infection. Low concern for PID, no vaginal discharge.   - Patient stable overall, no ongoing signs of infection/afebrile, will continue 10 day course of Augmentin on discharge       Fibroids  - Longstanding history of AUB and fibroids  - Multiple fibroids on TVUS, hard to characterize  - CT CAP 8/13: differential pedunculated fibroid (20cm) with the possibility of malignant degeneration/leiomyosarcoma to be considered.  Solid ovarian neoplasm is also included in the differential diagnosis         Headache  - New HA this AM unresponsive to ibuprofen, likely secondary to anemia > Tylenol 1000mg > HA resolved     Transaminitis  - AST/ALT 99/94  - Likely acute phase reactant  - Hep panel pending    VTE Risk  Mitigation (From admission, onward)         Ordered     enoxaparin injection 40 mg  Daily         08/14/22 1653     IP VTE HIGH RISK PATIENT  Once         08/12/22 1222     Place sequential compression device  Until discontinued         08/12/22 1222     Place KAYLEN hose  Until discontinued         08/12/22 1222                Was patel catheter removed? NA    Florencia Narvaez MD  Gynecologic Oncology  [facility] - Oncology ([facility])

## 2022-08-16 LAB — BACTERIA BLD CULT: NORMAL

## 2022-08-17 ENCOUNTER — TELEPHONE (OUTPATIENT)
Dept: GYNECOLOGIC ONCOLOGY | Facility: CLINIC | Age: 57
End: 2022-08-17
Payer: COMMERCIAL

## 2022-08-17 ENCOUNTER — TELEPHONE (OUTPATIENT)
Dept: GYNECOLOGIC ONCOLOGY | Facility: CLINIC | Age: 57
End: 2022-08-17

## 2022-08-17 LAB — BACTERIA BLD CULT: NORMAL

## 2022-08-17 NOTE — TELEPHONE ENCOUNTER
"----- Message from Virgilio Zuniga sent at 2022  8:51 AM CDT -----  Consult/Advisory:          Name Of Caller: Self      Contact Preference?: [phone] (home)       Provider Name: Alexx      Does patient feel the need to be seen today? No      What is the nature of the call?: Requesting to send email of [facility] paperwork. Would also would like to discuss medroxy progesterone 10 mg medication and inquiring about why her chart doesn't include documentation of recent hospital visit          Additional Notes: No information for pt documented in any of the 3 charts (marked for merge), other than pt name, , phone #, and address; Pt stating Dr. Coffey was her admitting and attending physician during her recent hospital admission from  to 8/15.        "Thank you for all that you do for our patients"      "

## 2022-08-17 NOTE — TELEPHONE ENCOUNTER
Returned pt's call, patient was in the hospital from 8/11-8/15 and had some FMLA paperwork that needed to be completed gave pt disability email information, pt also states that she was suppose to be having surgery with  advised pt that needed for follow up appointment, that appointment was scheduled pt is aware.  Pt has 2 MRN accounts.

## 2022-08-19 ENCOUNTER — TELEPHONE (OUTPATIENT)
Dept: GYNECOLOGIC ONCOLOGY | Facility: CLINIC | Age: 57
End: 2022-08-19
Payer: COMMERCIAL

## 2022-08-19 NOTE — TELEPHONE ENCOUNTER
"----- Message from Juany Valadez sent at 8/19/2022  8:20 AM CDT -----  Regarding: speak with office  Contact: marta  Consult/Advisory:           Name Of Caller: marta    Contact Preference?: [phone]            Provider Name: Dr Melvin perez    Does patient feel the need to be seen today? no           What is the nature of the call?: pt is calling to speak with  about vaginal bleeding           Additional Notes:  "Thank you for all that you do for our patients'"     "

## 2022-08-19 NOTE — TELEPHONE ENCOUNTER
Spoke with pt about her continuing to have vaginal bleeding, pt was recently hospitalized for bleeding and was seen by  and will be needing surgery scheduled. Patient want's to know what can be done to help control the bleeding, advised I was forwarded her concerns to the doctor regarding bleeding and surgery scheduling.

## 2022-08-22 ENCOUNTER — TELEPHONE (OUTPATIENT)
Dept: GYNECOLOGIC ONCOLOGY | Facility: CLINIC | Age: 57
End: 2022-08-22
Payer: COMMERCIAL

## 2022-08-22 NOTE — TELEPHONE ENCOUNTER
Spoke to pt regarding uploading her results on the portal, she was advised to fax the results to our and we give to the doctor for review. Pt verbalized understanding. Fax number given

## 2022-08-22 NOTE — TELEPHONE ENCOUNTER
----- Message from Florencia Narvaez MD sent at 8/22/2022  2:24 PM CDT -----  Hello,     This patient reached out to me via email regarding updated endometrial biopsy results from an OSH. She is trying to send them to Dr Coffey however she sent me a code that I am not sure how to use to access her results. Would someone be able to reach out to her to help upload these results to her chart?     Thank you!     Elaina Narvaez MD  PGY 2  Obstetrics and Gynecology

## 2022-08-24 ENCOUNTER — TELEPHONE (OUTPATIENT)
Dept: GYNECOLOGIC ONCOLOGY | Facility: CLINIC | Age: 57
End: 2022-08-24
Payer: COMMERCIAL

## 2022-08-24 DIAGNOSIS — N95.0 POST-MENOPAUSAL BLEEDING: Primary | ICD-10-CM

## 2022-08-24 DIAGNOSIS — D21.9 FIBROIDS: ICD-10-CM

## 2022-08-30 ENCOUNTER — TELEPHONE (OUTPATIENT)
Dept: GYNECOLOGIC ONCOLOGY | Facility: CLINIC | Age: 57
End: 2022-08-30
Payer: COMMERCIAL

## 2022-08-30 NOTE — TELEPHONE ENCOUNTER
"----- Message from Taniya Jack sent at 8/30/2022 11:18 AM CDT -----  Consult/Advisory:           Name Of Caller: self    Contact Preference?: [phone]    What is the nature of the call?: requesting a call back to discuss some questions she has in regards to surgery            Additional Notes:  "Thank you for all that you do for our patients'"     "

## 2022-09-16 ENCOUNTER — ANESTHESIA EVENT (OUTPATIENT)
Dept: SURGERY | Facility: HOSPITAL | Age: 57
DRG: 742 | End: 2022-09-16
Payer: COMMERCIAL

## 2022-09-16 ENCOUNTER — TELEPHONE (OUTPATIENT)
Dept: GYNECOLOGIC ONCOLOGY | Facility: CLINIC | Age: 57
End: 2022-09-16
Payer: COMMERCIAL

## 2022-09-16 RX ORDER — MEDROXYPROGESTERONE ACETATE 10 MG/1
10 TABLET ORAL DAILY
Status: ON HOLD | COMMUNITY
Start: 2022-09-06 | End: 2022-09-19

## 2022-09-16 RX ORDER — FERROUS SULFATE TAB 325 MG (65 MG ELEMENTAL FE) 325 (65 FE) MG
TAB ORAL DAILY
COMMUNITY
Start: 2022-09-09

## 2022-09-16 NOTE — PRE-PROCEDURE INSTRUCTIONS
PreOp Instructions given:   - Verbal medication information (what to hold and what to take)   - NPO guidelines 5029-0864  - Arrival place directions given; time to be given the day before procedure by the   Surgeon's Office [address] [facility]  - Bathing with antibacterial soap   - Don't wear any jewelry or bring any valuables AM of surgery   - No makeup or moisturizer to face   - No perfume/cologne, powder, lotions or aftershave   Pt. verbalized understanding.   Pt denies any h/o Anesthesia/Sedation complications or side effects.

## 2022-09-18 NOTE — ANESTHESIA PREPROCEDURE EVALUATION
[facility]  Anesthesia Pre-Operative Evaluation         Patient Name: Priyanka Raines  YOB: 1965  MRN: [id number]    SUBJECTIVE:     Pre-operative evaluation for Procedure(s) (LRB):  HYSTERECTOMY, TOTAL, ABDOMINAL, WITH BILATERAL SALPINGO-OOPHORECTOMY (N/A)     09/18/2022    Priyanka Raines is a 57 y.o. female w/ BMI 35 and uterine fibroids who presents for the above procedure.    LDA: None documented.    Prev airway: None documented.     Drips: None documented.    Patient Active Problem List   Diagnosis    Post-menopausal bleeding    Transaminitis    Fibroids    Febrile transfusion reaction    Anemia    Obesity       Review of patient's allergies indicates:  No Known Allergies    Current Inpatient Medications:      No current facility-administered medications on file prior to encounter.     Current Outpatient Medications on File Prior to Encounter   Medication Sig Dispense Refill    medroxyPROGESTERone (PROVERA) 10 MG tablet Take 10 mg by mouth once daily.      senna-docusate 8.6-50 mg (PERICOLACE) 8.6-50 mg per tablet Take 1 tablet by mouth 2 (two) times daily. 30 tablet 1    FEROSUL 325 mg (65 mg iron) Tab tablet Take by mouth once daily.      oxyCODONE (ROXICODONE) 5 MG immediate release tablet Take 1 tablet (5 mg total) by mouth every 6 (six) hours as needed for Pain. (Patient not taking: Reported on 9/16/2022) 10 tablet 0       No past surgical history on file.    Social History     Socioeconomic History    Marital status: Unknown       OBJECTIVE:     Vital Signs Range (Last 24H):         CBC:   No results for input(s): WBC, RBC, HGB, HCT, PLT, MCV, MCH, MCHC in the last 72 hours.    CMP: No results for input(s): NA, K, CL, CO2, BUN, CREATININE, GLU, MG, PHOS, CALCIUM, ALBUMIN, PROT, ALKPHOS, ALT, AST, BILITOT in the last 72 hours.    INR:  No results for input(s): PT, INR, PROTIME, APTT in the last 72 hours.    Diagnostic Studies: No relevant studies.    EKG:    Results for orders placed or performed during the hospital encounter of 08/11/22   EKG 12-lead    Collection Time: 08/13/22  9:38 AM    Narrative    Test Reason : Z01.811,    Vent. Rate : 088 BPM     Atrial Rate : 088 BPM     P-R Int : 148 ms          QRS Dur : 076 ms      QT Int : 366 ms       P-R-T Axes : 060 016 020 degrees     QTc Int : 442 ms    Normal sinus rhythm  Normal ECG  No previous ECGs available  Confirmed by Kuldeep MONREAL MD ([id number]) on 8/14/2022 11:10:46 AM    Referred By: ROCIO GIL           Confirmed By:Kuldeep MONREAL MD        2D ECHO:   No results found for this or any previous visit.         ASSESSMENT/PLAN:         Pre-op Assessment    I have reviewed the Patient Summary Reports.     I have reviewed the Nursing Notes.    I have reviewed the Medications.     Review of Systems  Anesthesia Hx:  No problems with previous Anesthesia Denies Hx of Anesthetic complications  Neg history of prior surgery. Denies Family Hx of Anesthesia complications.   Denies Personal Hx of Anesthesia complications.   Social:  Non-Smoker, No Alcohol Use    Hematology/Oncology:         -- Anemia: Denies Current/Recent Cancer   Cardiovascular:   Denies Hypertension.  Denies CAD.    Denies Dysrhythmias.   Denies CHF. no hyperlipidemia    Pulmonary:   Denies COPD.  Denies Asthma.  Denies Sleep Apnea.    Renal/:   Denies Chronic Renal Disease.     Hepatic/GI:   Denies GERD. Denies Liver Disease.    Musculoskeletal:   Denies Arthritis.     Neurological:   Denies CVA. Denies Neuromuscular Disease.  Denies Seizures.   Denies Chronic Pain Syndrome   Endocrine:   Denies Diabetes. Denies Hyperthyroidism.  Obesity / BMI > 30  Psych:   denies anxiety denies depression          Physical Exam  General: Well nourished, Cooperative, Alert and Oriented    Airway:  Mallampati: II   Mouth Opening: Normal  TM Distance: Normal  Tongue: Normal  Neck ROM: Normal ROM    Dental:  Intact        Anesthesia Plan  Type of Anesthesia, risks & benefits  discussed:    Anesthesia Type: Gen ETT  Intra-op Monitoring Plan: Standard ASA Monitors  Post Op Pain Control Plan: multimodal analgesia and IV/PO Opioids PRN  Induction:  IV  Airway Plan: Direct and Video, Post-Induction  Informed Consent: Informed consent signed with the Patient and all parties understand the risks and agree with anesthesia plan.  All questions answered.   ASA Score: 2  Day of Surgery Review of History & Physical: H&P Update referred to the surgeon/provider.    Ready For Surgery From Anesthesia Perspective.     .

## 2022-09-19 ENCOUNTER — HOSPITAL ENCOUNTER (INPATIENT)
Facility: HOSPITAL | Age: 57
LOS: 2 days | Discharge: HOME | DRG: 742 | End: 2022-09-21
Attending: OBSTETRICS & GYNECOLOGY | Admitting: OBSTETRICS & GYNECOLOGY
Payer: COMMERCIAL

## 2022-09-19 ENCOUNTER — ANESTHESIA (OUTPATIENT)
Dept: SURGERY | Facility: HOSPITAL | Age: 57
DRG: 742 | End: 2022-09-19
Payer: COMMERCIAL

## 2022-09-19 DIAGNOSIS — Z90.710 S/P TAH (TOTAL ABDOMINAL HYSTERECTOMY): Primary | ICD-10-CM

## 2022-09-19 DIAGNOSIS — D21.9 FIBROIDS: ICD-10-CM

## 2022-09-19 DIAGNOSIS — D25.9 FIBROID UTERUS: ICD-10-CM

## 2022-09-19 LAB
ABO + RH BLD: NORMAL
BASOPHILS # BLD AUTO: 0.07 K/UL (ref 0–0.2)
BASOPHILS NFR BLD: 1.2 % (ref 0–1.9)
BLD GP AB SCN CELLS X3 SERPL QL: NORMAL
DIFFERENTIAL METHOD: ABNORMAL
EOSINOPHIL # BLD AUTO: 0.2 K/UL (ref 0–0.5)
EOSINOPHIL NFR BLD: 3.3 % (ref 0–8)
ERYTHROCYTE [DISTWIDTH] IN BLOOD BY AUTOMATED COUNT: 13.2 % (ref 11.5–14.5)
HCT VFR BLD AUTO: 34.1 % (ref 37–48.5)
HGB BLD-MCNC: 11 G/DL (ref 12–16)
IMM GRANULOCYTES # BLD AUTO: 0.01 K/UL (ref 0–0.04)
IMM GRANULOCYTES NFR BLD AUTO: 0.2 % (ref 0–0.5)
LYMPHOCYTES # BLD AUTO: 1.5 K/UL (ref 1–4.8)
LYMPHOCYTES NFR BLD: 25.3 % (ref 18–48)
MCH RBC QN AUTO: 26.5 PG (ref 27–31)
MCHC RBC AUTO-ENTMCNC: 32.3 G/DL (ref 32–36)
MCV RBC AUTO: 82 FL (ref 82–98)
MONOCYTES # BLD AUTO: 0.4 K/UL (ref 0.3–1)
MONOCYTES NFR BLD: 6.5 % (ref 4–15)
NEUTROPHILS # BLD AUTO: 3.8 K/UL (ref 1.8–7.7)
NEUTROPHILS NFR BLD: 63.5 % (ref 38–73)
NRBC BLD-RTO: 0 /100 WBC
PLATELET # BLD AUTO: 213 K/UL (ref 150–450)
PLATELET BLD QL SMEAR: ABNORMAL
PMV BLD AUTO: 12.1 FL (ref 9.2–12.9)
RBC # BLD AUTO: 4.15 M/UL (ref 4–5.4)
WBC # BLD AUTO: 5.98 K/UL (ref 3.9–12.7)

## 2022-09-19 PROCEDURE — 25000003 PHARM REV CODE 250: Performed by: STUDENT IN AN ORGANIZED HEALTH CARE EDUCATION/TRAINING PROGRAM

## 2022-09-19 PROCEDURE — 64450 BILATERAL QL SS: ICD-10-PCS | Mod: 59,50,, | Performed by: ANESTHESIOLOGY

## 2022-09-19 PROCEDURE — 63600175 PHARM REV CODE 636 W HCPCS

## 2022-09-19 PROCEDURE — 88305 TISSUE EXAM BY PATHOLOGIST: CPT | Mod: 26,,, | Performed by: PATHOLOGY

## 2022-09-19 PROCEDURE — 88305 TISSUE EXAM BY PATHOLOGIST: CPT | Performed by: PATHOLOGY

## 2022-09-19 PROCEDURE — 88331 PATH CONSLTJ SURG 1 BLK 1SPC: CPT | Performed by: PATHOLOGY

## 2022-09-19 PROCEDURE — D9220A PRA ANESTHESIA: ICD-10-PCS | Mod: ,,, | Performed by: ANESTHESIOLOGY

## 2022-09-19 PROCEDURE — 63600175 PHARM REV CODE 636 W HCPCS: Performed by: STUDENT IN AN ORGANIZED HEALTH CARE EDUCATION/TRAINING PROGRAM

## 2022-09-19 PROCEDURE — 85025 COMPLETE CBC W/AUTO DIFF WBC: CPT

## 2022-09-19 PROCEDURE — 64450 NJX AA&/STRD OTHER PN/BRANCH: CPT | Performed by: STUDENT IN AN ORGANIZED HEALTH CARE EDUCATION/TRAINING PROGRAM

## 2022-09-19 PROCEDURE — 58150 PR TOTAL ABDOM HYSTERECTOMY: ICD-10-PCS | Mod: 22,,, | Performed by: OBSTETRICS & GYNECOLOGY

## 2022-09-19 PROCEDURE — 36000709 HC OR TIME LEV III EA ADD 15 MIN: Performed by: OBSTETRICS & GYNECOLOGY

## 2022-09-19 PROCEDURE — 25000003 PHARM REV CODE 250

## 2022-09-19 PROCEDURE — 25000003 PHARM REV CODE 250: Performed by: OBSTETRICS & GYNECOLOGY

## 2022-09-19 PROCEDURE — 71000033 HC RECOVERY, INTIAL HOUR: Performed by: OBSTETRICS & GYNECOLOGY

## 2022-09-19 PROCEDURE — 37000008 HC ANESTHESIA 1ST 15 MINUTES: Performed by: OBSTETRICS & GYNECOLOGY

## 2022-09-19 PROCEDURE — 63600175 PHARM REV CODE 636 W HCPCS: Performed by: OBSTETRICS & GYNECOLOGY

## 2022-09-19 PROCEDURE — 88331 PR  PATH CONSULT IN SURG,W FRZ SEC: ICD-10-PCS | Mod: 26,,, | Performed by: PATHOLOGY

## 2022-09-19 PROCEDURE — 94761 N-INVAS EAR/PLS OXIMETRY MLT: CPT

## 2022-09-19 PROCEDURE — 88307 TISSUE EXAM BY PATHOLOGIST: CPT | Performed by: PATHOLOGY

## 2022-09-19 PROCEDURE — D9220A PRA ANESTHESIA: Mod: ,,, | Performed by: ANESTHESIOLOGY

## 2022-09-19 PROCEDURE — 86901 BLOOD TYPING SEROLOGIC RH(D): CPT

## 2022-09-19 PROCEDURE — 88307 TISSUE EXAM BY PATHOLOGIST: CPT | Mod: 26,,, | Performed by: PATHOLOGY

## 2022-09-19 PROCEDURE — 58150 TOTAL HYSTERECTOMY: CPT | Mod: 22,,, | Performed by: OBSTETRICS & GYNECOLOGY

## 2022-09-19 PROCEDURE — 27201423 OPTIME MED/SURG SUP & DEVICES STERILE SUPPLY: Performed by: OBSTETRICS & GYNECOLOGY

## 2022-09-19 PROCEDURE — 37000009 HC ANESTHESIA EA ADD 15 MINS: Performed by: OBSTETRICS & GYNECOLOGY

## 2022-09-19 PROCEDURE — 64450 NJX AA&/STRD OTHER PN/BRANCH: CPT | Mod: 59,50,, | Performed by: ANESTHESIOLOGY

## 2022-09-19 PROCEDURE — 88307 PR  SURG PATH,LEVEL V: ICD-10-PCS | Mod: 26,,, | Performed by: PATHOLOGY

## 2022-09-19 PROCEDURE — 71000039 HC RECOVERY, EACH ADD'L HOUR: Performed by: OBSTETRICS & GYNECOLOGY

## 2022-09-19 PROCEDURE — 25000003 PHARM REV CODE 250: Performed by: ANESTHESIOLOGY

## 2022-09-19 PROCEDURE — 36000708 HC OR TIME LEV III 1ST 15 MIN: Performed by: OBSTETRICS & GYNECOLOGY

## 2022-09-19 PROCEDURE — 71000015 HC POSTOP RECOV 1ST HR: Performed by: OBSTETRICS & GYNECOLOGY

## 2022-09-19 PROCEDURE — 71000016 HC POSTOP RECOV ADDL HR: Performed by: OBSTETRICS & GYNECOLOGY

## 2022-09-19 PROCEDURE — 76942 BILATERAL QL SS: ICD-10-PCS | Mod: 26,,, | Performed by: ANESTHESIOLOGY

## 2022-09-19 PROCEDURE — 20600001 HC STEP DOWN PRIVATE ROOM

## 2022-09-19 PROCEDURE — 76942 ECHO GUIDE FOR BIOPSY: CPT | Mod: 26,,, | Performed by: ANESTHESIOLOGY

## 2022-09-19 PROCEDURE — 88331 PATH CONSLTJ SURG 1 BLK 1SPC: CPT | Mod: 26,,, | Performed by: PATHOLOGY

## 2022-09-19 PROCEDURE — 88305 TISSUE EXAM BY PATHOLOGIST: ICD-10-PCS | Mod: 26,,, | Performed by: PATHOLOGY

## 2022-09-19 RX ORDER — HALOPERIDOL 5 MG/ML
0.5 INJECTION INTRAMUSCULAR EVERY 10 MIN PRN
Status: DISCONTINUED | OUTPATIENT
Start: 2022-09-19 | End: 2022-09-19 | Stop reason: HOSPADM

## 2022-09-19 RX ORDER — SODIUM CHLORIDE, SODIUM LACTATE, POTASSIUM CHLORIDE, CALCIUM CHLORIDE 600; 310; 30; 20 MG/100ML; MG/100ML; MG/100ML; MG/100ML
INJECTION, SOLUTION INTRAVENOUS CONTINUOUS
Status: DISCONTINUED | OUTPATIENT
Start: 2022-09-19 | End: 2022-09-20

## 2022-09-19 RX ORDER — DEXAMETHASONE SODIUM PHOSPHATE 4 MG/ML
INJECTION, SOLUTION INTRA-ARTICULAR; INTRALESIONAL; INTRAMUSCULAR; INTRAVENOUS; SOFT TISSUE
Status: DISCONTINUED | OUTPATIENT
Start: 2022-09-19 | End: 2022-09-19

## 2022-09-19 RX ORDER — SODIUM CHLORIDE 0.9 % (FLUSH) 0.9 %
10 SYRINGE (ML) INJECTION
Status: DISCONTINUED | OUTPATIENT
Start: 2022-09-19 | End: 2022-09-21 | Stop reason: HOSPADM

## 2022-09-19 RX ORDER — HYDROMORPHONE HYDROCHLORIDE 1 MG/ML
0.4 INJECTION, SOLUTION INTRAMUSCULAR; INTRAVENOUS; SUBCUTANEOUS
Status: DISCONTINUED | OUTPATIENT
Start: 2022-09-19 | End: 2022-09-21 | Stop reason: HOSPADM

## 2022-09-19 RX ORDER — DIPHENHYDRAMINE HCL 25 MG
25 CAPSULE ORAL EVERY 6 HOURS PRN
Status: DISCONTINUED | OUTPATIENT
Start: 2022-09-19 | End: 2022-09-21 | Stop reason: HOSPADM

## 2022-09-19 RX ORDER — ATROPA BELLADONNA AND OPIUM 16.2; 6 MG/1; MG/1
60 SUPPOSITORY RECTAL EVERY 6 HOURS PRN
Status: DISCONTINUED | OUTPATIENT
Start: 2022-09-19 | End: 2022-09-21 | Stop reason: HOSPADM

## 2022-09-19 RX ORDER — KETAMINE HCL IN 0.9 % NACL 50 MG/5 ML
SYRINGE (ML) INTRAVENOUS
Status: DISCONTINUED | OUTPATIENT
Start: 2022-09-19 | End: 2022-09-19

## 2022-09-19 RX ORDER — ACETAMINOPHEN 500 MG
1000 TABLET ORAL
Status: COMPLETED | OUTPATIENT
Start: 2022-09-19 | End: 2022-09-19

## 2022-09-19 RX ORDER — ROCURONIUM BROMIDE 10 MG/ML
INJECTION, SOLUTION INTRAVENOUS
Status: DISCONTINUED | OUTPATIENT
Start: 2022-09-19 | End: 2022-09-19

## 2022-09-19 RX ORDER — SENNOSIDES 8.6 MG/1
8.6 TABLET ORAL 2 TIMES DAILY
Status: DISCONTINUED | OUTPATIENT
Start: 2022-09-19 | End: 2022-09-21 | Stop reason: HOSPADM

## 2022-09-19 RX ORDER — LIDOCAINE HYDROCHLORIDE 10 MG/ML
1 INJECTION, SOLUTION EPIDURAL; INFILTRATION; INTRACAUDAL; PERINEURAL ONCE
Status: COMPLETED | OUTPATIENT
Start: 2022-09-19 | End: 2022-09-19

## 2022-09-19 RX ORDER — PROCHLORPERAZINE EDISYLATE 5 MG/ML
5 INJECTION INTRAMUSCULAR; INTRAVENOUS EVERY 6 HOURS PRN
Status: DISCONTINUED | OUTPATIENT
Start: 2022-09-19 | End: 2022-09-21 | Stop reason: HOSPADM

## 2022-09-19 RX ORDER — PROPOFOL 10 MG/ML
VIAL (ML) INTRAVENOUS
Status: DISCONTINUED | OUTPATIENT
Start: 2022-09-19 | End: 2022-09-19

## 2022-09-19 RX ORDER — ACETAMINOPHEN 500 MG
1000 TABLET ORAL EVERY 6 HOURS
Status: DISCONTINUED | OUTPATIENT
Start: 2022-09-19 | End: 2022-09-21 | Stop reason: HOSPADM

## 2022-09-19 RX ORDER — NALOXONE HCL 0.4 MG/ML
0.02 VIAL (ML) INJECTION
Status: DISCONTINUED | OUTPATIENT
Start: 2022-09-19 | End: 2022-09-21 | Stop reason: HOSPADM

## 2022-09-19 RX ORDER — ONDANSETRON 2 MG/ML
4 INJECTION INTRAMUSCULAR; INTRAVENOUS EVERY 6 HOURS PRN
Status: DISCONTINUED | OUTPATIENT
Start: 2022-09-19 | End: 2022-09-21 | Stop reason: HOSPADM

## 2022-09-19 RX ORDER — IBUPROFEN 600 MG/1
600 TABLET ORAL EVERY 6 HOURS
Status: DISCONTINUED | OUTPATIENT
Start: 2022-09-20 | End: 2022-09-21 | Stop reason: HOSPADM

## 2022-09-19 RX ORDER — FENTANYL CITRATE 50 UG/ML
100 INJECTION, SOLUTION INTRAMUSCULAR; INTRAVENOUS EVERY 5 MIN PRN
Status: DISCONTINUED | OUTPATIENT
Start: 2022-09-19 | End: 2022-09-19

## 2022-09-19 RX ORDER — OXYCODONE HYDROCHLORIDE 10 MG/1
10 TABLET ORAL EVERY 4 HOURS PRN
Status: DISCONTINUED | OUTPATIENT
Start: 2022-09-19 | End: 2022-09-21 | Stop reason: HOSPADM

## 2022-09-19 RX ORDER — LIDOCAINE HYDROCHLORIDE 20 MG/ML
INJECTION, SOLUTION EPIDURAL; INFILTRATION; INTRACAUDAL; PERINEURAL
Status: DISCONTINUED | OUTPATIENT
Start: 2022-09-19 | End: 2022-09-19

## 2022-09-19 RX ORDER — TALC
6 POWDER (GRAM) TOPICAL NIGHTLY PRN
Status: DISCONTINUED | OUTPATIENT
Start: 2022-09-19 | End: 2022-09-21 | Stop reason: HOSPADM

## 2022-09-19 RX ORDER — HYDRALAZINE HYDROCHLORIDE 20 MG/ML
10 INJECTION INTRAMUSCULAR; INTRAVENOUS EVERY 6 HOURS PRN
Status: DISCONTINUED | OUTPATIENT
Start: 2022-09-19 | End: 2022-09-21 | Stop reason: HOSPADM

## 2022-09-19 RX ORDER — HYDROMORPHONE HYDROCHLORIDE 1 MG/ML
0.2 INJECTION, SOLUTION INTRAMUSCULAR; INTRAVENOUS; SUBCUTANEOUS
Status: DISCONTINUED | OUTPATIENT
Start: 2022-09-19 | End: 2022-09-19 | Stop reason: HOSPADM

## 2022-09-19 RX ORDER — ADHESIVE BANDAGE
30 BANDAGE TOPICAL 2 TIMES DAILY
Status: DISCONTINUED | OUTPATIENT
Start: 2022-09-19 | End: 2022-09-21 | Stop reason: HOSPADM

## 2022-09-19 RX ORDER — KETOROLAC TROMETHAMINE 30 MG/ML
15 INJECTION, SOLUTION INTRAMUSCULAR; INTRAVENOUS EVERY 6 HOURS
Status: COMPLETED | OUTPATIENT
Start: 2022-09-19 | End: 2022-09-20

## 2022-09-19 RX ORDER — OXYCODONE HYDROCHLORIDE 5 MG/1
5 TABLET ORAL EVERY 4 HOURS PRN
Status: DISCONTINUED | OUTPATIENT
Start: 2022-09-19 | End: 2022-09-21 | Stop reason: HOSPADM

## 2022-09-19 RX ORDER — MUPIROCIN 20 MG/G
OINTMENT TOPICAL
Status: DISCONTINUED | OUTPATIENT
Start: 2022-09-19 | End: 2022-09-19

## 2022-09-19 RX ORDER — LANOLIN ALCOHOL/MO/W.PET/CERES
1 CREAM (GRAM) TOPICAL DAILY
Status: DISCONTINUED | OUTPATIENT
Start: 2022-09-19 | End: 2022-09-21 | Stop reason: HOSPADM

## 2022-09-19 RX ORDER — EPHEDRINE SULFATE 50 MG/ML
INJECTION, SOLUTION INTRAVENOUS
Status: DISCONTINUED | OUTPATIENT
Start: 2022-09-19 | End: 2022-09-19

## 2022-09-19 RX ORDER — SIMETHICONE 80 MG
1 TABLET,CHEWABLE ORAL 3 TIMES DAILY PRN
Status: DISCONTINUED | OUTPATIENT
Start: 2022-09-19 | End: 2022-09-21 | Stop reason: HOSPADM

## 2022-09-19 RX ORDER — PHENYLEPHRINE HCL IN 0.9% NACL 1 MG/10 ML
SYRINGE (ML) INTRAVENOUS
Status: DISCONTINUED | OUTPATIENT
Start: 2022-09-19 | End: 2022-09-19

## 2022-09-19 RX ORDER — HEPARIN SODIUM 5000 [USP'U]/ML
INJECTION, SOLUTION INTRAVENOUS; SUBCUTANEOUS
Status: DISCONTINUED | OUTPATIENT
Start: 2022-09-19 | End: 2022-09-19 | Stop reason: HOSPADM

## 2022-09-19 RX ORDER — MIDAZOLAM HYDROCHLORIDE 1 MG/ML
2 INJECTION INTRAMUSCULAR; INTRAVENOUS
Status: DISCONTINUED | OUTPATIENT
Start: 2022-09-19 | End: 2022-09-19

## 2022-09-19 RX ORDER — ONDANSETRON 2 MG/ML
INJECTION INTRAMUSCULAR; INTRAVENOUS
Status: DISCONTINUED | OUTPATIENT
Start: 2022-09-19 | End: 2022-09-19

## 2022-09-19 RX ORDER — BUPIVACAINE HYDROCHLORIDE 7.5 MG/ML
INJECTION, SOLUTION EPIDURAL; RETROBULBAR
Status: COMPLETED | OUTPATIENT
Start: 2022-09-19 | End: 2022-09-19

## 2022-09-19 RX ORDER — CEFAZOLIN SODIUM/WATER 2 G/20 ML
2 SYRINGE (ML) INTRAVENOUS
Status: COMPLETED | OUTPATIENT
Start: 2022-09-19 | End: 2022-09-19

## 2022-09-19 RX ORDER — FENTANYL CITRATE 50 UG/ML
INJECTION, SOLUTION INTRAMUSCULAR; INTRAVENOUS
Status: DISCONTINUED | OUTPATIENT
Start: 2022-09-19 | End: 2022-09-19

## 2022-09-19 RX ADMIN — ACETAMINOPHEN 1000 MG: 500 TABLET ORAL at 05:09

## 2022-09-19 RX ADMIN — HYDROMORPHONE HYDROCHLORIDE 0.4 MG: 1 INJECTION, SOLUTION INTRAMUSCULAR; INTRAVENOUS; SUBCUTANEOUS at 01:09

## 2022-09-19 RX ADMIN — HALOPERIDOL LACTATE 0.5 MG: 5 INJECTION, SOLUTION INTRAMUSCULAR at 10:09

## 2022-09-19 RX ADMIN — SODIUM CHLORIDE, SODIUM LACTATE, POTASSIUM CHLORIDE, AND CALCIUM CHLORIDE: .6; .31; .03; .02 INJECTION, SOLUTION INTRAVENOUS at 11:09

## 2022-09-19 RX ADMIN — LIDOCAINE HYDROCHLORIDE 0.5 MG: 10 INJECTION, SOLUTION EPIDURAL; INFILTRATION; INTRACAUDAL; PERINEURAL at 06:09

## 2022-09-19 RX ADMIN — SODIUM CHLORIDE, SODIUM GLUCONATE, SODIUM ACETATE, POTASSIUM CHLORIDE, MAGNESIUM CHLORIDE, SODIUM PHOSPHATE, DIBASIC, AND POTASSIUM PHOSPHATE: .53; .5; .37; .037; .03; .012; .00082 INJECTION, SOLUTION INTRAVENOUS at 08:09

## 2022-09-19 RX ADMIN — OXYCODONE HYDROCHLORIDE 10 MG: 10 TABLET ORAL at 10:09

## 2022-09-19 RX ADMIN — Medication 200 MCG: at 08:09

## 2022-09-19 RX ADMIN — HYDROMORPHONE HYDROCHLORIDE 0.2 MG: 1 INJECTION, SOLUTION INTRAMUSCULAR; INTRAVENOUS; SUBCUTANEOUS at 10:09

## 2022-09-19 RX ADMIN — ROCURONIUM BROMIDE 20 MG: 10 INJECTION INTRAVENOUS at 07:09

## 2022-09-19 RX ADMIN — KETOROLAC TROMETHAMINE 15 MG: 30 INJECTION, SOLUTION INTRAMUSCULAR; INTRAVENOUS at 11:09

## 2022-09-19 RX ADMIN — SODIUM CHLORIDE: 9 INJECTION, SOLUTION INTRAVENOUS at 06:09

## 2022-09-19 RX ADMIN — ROCURONIUM BROMIDE 20 MG: 10 INJECTION INTRAVENOUS at 09:09

## 2022-09-19 RX ADMIN — MUPIROCIN: 20 OINTMENT TOPICAL at 05:09

## 2022-09-19 RX ADMIN — KETOROLAC TROMETHAMINE 15 MG: 30 INJECTION, SOLUTION INTRAMUSCULAR; INTRAVENOUS at 06:09

## 2022-09-19 RX ADMIN — MAGNESIUM HYDROXIDE 2400 MG: 400 SUSPENSION ORAL at 09:09

## 2022-09-19 RX ADMIN — DEXAMETHASONE SODIUM PHOSPHATE 8 MG: 4 INJECTION INTRA-ARTICULAR; INTRALESIONAL; INTRAMUSCULAR; INTRAVENOUS; SOFT TISSUE at 07:09

## 2022-09-19 RX ADMIN — HYDROMORPHONE HYDROCHLORIDE 0.4 MG: 1 INJECTION, SOLUTION INTRAMUSCULAR; INTRAVENOUS; SUBCUTANEOUS at 10:09

## 2022-09-19 RX ADMIN — Medication 20 MG: at 07:09

## 2022-09-19 RX ADMIN — SUGAMMADEX 200 MG: 100 INJECTION, SOLUTION INTRAVENOUS at 09:09

## 2022-09-19 RX ADMIN — ACETAMINOPHEN 1000 MG: 500 TABLET ORAL at 11:09

## 2022-09-19 RX ADMIN — FENTANYL CITRATE 50 MCG: 0.05 INJECTION, SOLUTION INTRAMUSCULAR; INTRAVENOUS at 07:09

## 2022-09-19 RX ADMIN — Medication 10 MG: at 08:09

## 2022-09-19 RX ADMIN — FERROUS SULFATE TAB 325 MG (65 MG ELEMENTAL FE) 1 EACH: 325 (65 FE) TAB at 10:09

## 2022-09-19 RX ADMIN — HYDROMORPHONE HYDROCHLORIDE 0.2 MG: 1 INJECTION, SOLUTION INTRAMUSCULAR; INTRAVENOUS; SUBCUTANEOUS at 12:09

## 2022-09-19 RX ADMIN — Medication 100 MCG: at 08:09

## 2022-09-19 RX ADMIN — MIDAZOLAM 2 MG: 1 INJECTION INTRAMUSCULAR; INTRAVENOUS at 06:09

## 2022-09-19 RX ADMIN — HYDROMORPHONE HYDROCHLORIDE 0.2 MG: 1 INJECTION, SOLUTION INTRAMUSCULAR; INTRAVENOUS; SUBCUTANEOUS at 11:09

## 2022-09-19 RX ADMIN — SENNOSIDES 8.6 MG: 8.6 TABLET, FILM COATED ORAL at 09:09

## 2022-09-19 RX ADMIN — OXYCODONE HYDROCHLORIDE 10 MG: 10 TABLET ORAL at 02:09

## 2022-09-19 RX ADMIN — Medication 100 MCG: at 09:09

## 2022-09-19 RX ADMIN — Medication 10 MG: at 09:09

## 2022-09-19 RX ADMIN — LIDOCAINE HYDROCHLORIDE 80 MG: 20 INJECTION, SOLUTION EPIDURAL; INFILTRATION; INTRACAUDAL; PERINEURAL at 07:09

## 2022-09-19 RX ADMIN — FENTANYL CITRATE 100 MCG: 50 INJECTION, SOLUTION INTRAMUSCULAR; INTRAVENOUS at 06:09

## 2022-09-19 RX ADMIN — OXYCODONE HYDROCHLORIDE 10 MG: 10 TABLET ORAL at 09:09

## 2022-09-19 RX ADMIN — BUPIVACAINE HYDROCHLORIDE 30 ML: 7.5 INJECTION, SOLUTION EPIDURAL; RETROBULBAR at 06:09

## 2022-09-19 RX ADMIN — GLYCOPYRROLATE 0.2 MG: 0.2 INJECTION INTRAMUSCULAR; INTRAVENOUS at 07:09

## 2022-09-19 RX ADMIN — SODIUM CHLORIDE 0.3 MCG/KG/MIN: 9 INJECTION, SOLUTION INTRAVENOUS at 08:09

## 2022-09-19 RX ADMIN — PROPOFOL 200 MG: 10 INJECTION, EMULSION INTRAVENOUS at 07:09

## 2022-09-19 RX ADMIN — Medication 2 G: at 07:09

## 2022-09-19 RX ADMIN — ROCURONIUM BROMIDE 20 MG: 10 INJECTION INTRAVENOUS at 08:09

## 2022-09-19 RX ADMIN — EPHEDRINE SULFATE 20 MG: 50 INJECTION INTRAVENOUS at 07:09

## 2022-09-19 RX ADMIN — ONDANSETRON 4 MG: 2 INJECTION INTRAMUSCULAR; INTRAVENOUS at 09:09

## 2022-09-19 RX ADMIN — HYDROMORPHONE HYDROCHLORIDE 0.4 MG: 1 INJECTION, SOLUTION INTRAMUSCULAR; INTRAVENOUS; SUBCUTANEOUS at 04:09

## 2022-09-19 RX ADMIN — ROCURONIUM BROMIDE 50 MG: 10 INJECTION INTRAVENOUS at 07:09

## 2022-09-19 RX ADMIN — SODIUM CHLORIDE, SODIUM GLUCONATE, SODIUM ACETATE, POTASSIUM CHLORIDE, MAGNESIUM CHLORIDE, SODIUM PHOSPHATE, DIBASIC, AND POTASSIUM PHOSPHATE: .53; .5; .37; .037; .03; .012; .00082 INJECTION, SOLUTION INTRAVENOUS at 07:09

## 2022-09-19 NOTE — ANESTHESIA PROCEDURE NOTES
Intubation    Date/Time: 9/19/2022 7:11 AM  Performed by: Nitish Boss MD  Authorized by: Ayad Nielsen MD     Intubation:     Induction:  Intravenous    Intubated:  Postinduction    Mask Ventilation:  Easy mask    Attempts:  1    Attempted By:  Resident anesthesiologist    Method of Intubation:  Direct    Blade:  Kalina 3    Laryngeal View Grade: Grade I - full view of cords      Difficult Airway Encountered?: No      Airway Device:  Oral endotracheal tube    Airway Device Size:  7.0    Style/Cuff Inflation:  Cuffed (inflated to minimal occlusive pressure)    Tube secured:  21    Secured at:  The teeth    Placement Verified By:  Capnometry    Complicating Factors:  None    Findings Post-Intubation:  BS equal bilateral and atraumatic/condition of teeth unchanged

## 2022-09-19 NOTE — OP NOTE
[facility] ([facility])  Gynecologic Oncology  Operative Note    SUMMARY     Date of Procedure: 9/19/2022     Procedure: Procedure(s) (LRB):  HYSTERECTOMY, TOTAL, ABDOMINAL, WITH BILATERAL SALPINGO-OOPHORECTOMY (Bilateral)  LYSIS, ADHESIONS (N/A)       Surgeon(s) and Role:     * Melvin Coffey MD - Primary     * Saundra Moise MD - Resident - Assisting     * Jennifer Hale MD - Resident - Assisting    Pre-Operative Diagnosis: Post-menopausal bleeding [N95.0]  Fibroids [D21.9]    Post-Operative Diagnosis: Post-Op Diagnosis Codes:     * Post-menopausal bleeding [N95.0]     * Fibroids [D21.9]     * Left Ovarian Torsion with cystic mass    Anesthesia: General    Technical Procedures Used:   Exploratory Laparotomy  Total Abdominal Hysterectomy  Bilateral Salpingo-oophorectomy  Lysis of Adhesions    Description of the Findings of the Procedure: Normal external female genitalia.  Cervix normal appearing.  Uterus was irregular with multiple fibroids.  The left ovary was cystically enlarged to 20+ cm, dark brown and adherent to the posterior uterus, abdominal peritoneum, and small bowel.  The right tube and ovary appeared normal.  Small bowel with and peritoneum with inflammatory adhesions and thickening.  Appendix normal.  FROZEN:  Benign cyst with hemorrhage and necrosis    Note:  This case took approximately 1 hour longer than standard due to adhesions and inflammatory changes related to the mass.    Procedure in Detail: After noting appropriate consents and giving antibiotics, the patient was taken to the operating room, placed in dorsal lithotomy position. SCDs were started prior to anesthesia administration. Lovenox had been administered. She was then prepped and draped in the usual sterile manner. A patel was placed in a sterile manner.    Attention was now turned to the abdomen where a vertical skin incision was made with a scalpel and carried down to the fascia layer with the bovie. The fascia was incised  and the midline of the rectus muscle was identified. The peritoneum was tented up and entered sharply. The peritoneal and the fascial incisions were extended superiorly and inferiorly.      The pelvic mass was noted to adherent to the abdominal wall and small bowel.  These adhesions were taken down by meticulous sharp dissection using the Metzenbaum scissors.  After approximately 30 minutes of adhesiolysis, the mass was able to be elevated out of the incision.  It was noted to originate from the left adnexa.  The left IP was clamped, sealed, divided and then suture ligated.  The mass was removed and sent for frozen sections.  A self-retaining Bookwalter retractor was placed and the bowel was packed out of the pelvis.     Now the attention was turned to bilateral round ligaments which were cauterized and transected. Retroperitoneal access was gained and the ureters were identified bilaterally. Bilateral IP ligaments were isolated, cauterized, and transected. The IP on the left was dissected back proximal to where the mass had been removed.  The vesicouterine peritoneum was dissected and the bladder was dissected off the anterior lower uterine segment. Bilateral uterine arteries were skeletonized, suture ligated, and transected. Bilateral cardinals were sequentially clamped, suture ligated, and transected. The cervicovaginal junction was identified, clamped and the specimen was transected. Bilateral vaginal cuff angles were Roger stitched and the rest of the vagina was closed with figure-of-eight stitches with 0-vicryl sutures. Good closure and hemostasis were achieved.    Irrigation was performed and hemostasis was noted. Now all instruments and laps were removed. All counts were correct. The fascia was closed with #1 PDS suture in a running manner. The skin was closed with 4-0 Monocryl after a layer of subcutaneous sutures.  Good closure and hemostasis were achieved.    A final survey was done of the abdomen and  pelvis and the bowel was run.  There was no active bleeding and the integrity of the bowel, bladder, and other visible organs and tissues was again confirmed.   The patient was now successfully extubated and brought to PACU for recovery. I was present and scrubbed through the entire procedure.     Complications: No    Estimated Blood Loss (EBL): 400 ml           Condition: Good    Disposition: PACU - hemodynamically stable.    Attestation: I was present and scrubbed for the entire procedure.

## 2022-09-19 NOTE — TRANSFER OF CARE
"Anesthesia Transfer of Care Note    Patient: Priyanka Raines    Procedure(s) Performed: Procedure(s) (LRB):  HYSTERECTOMY, TOTAL, ABDOMINAL, WITH BILATERAL SALPINGO-OOPHORECTOMY (Bilateral)  LYSIS, ADHESIONS (N/A)    Patient location: PACU    Anesthesia Type: general    Transport from OR: Transported from OR on 6-10 L/min O2 by face mask with adequate spontaneous ventilation    Post pain: adequate analgesia    Post assessment: no apparent anesthetic complications    Post vital signs: stable    Level of consciousness: awake and alert    Nausea/Vomiting: no nausea/vomiting    Complications: none    Transfer of care protocol was followed      Last vitals:   Visit Vitals  BP (!) 146/55 (BP Location: Left arm, Patient Position: Lying)   Pulse 86   Temp 36.2 °C (97.2 °F) (Temporal)   Resp 20   Ht 5' 3" (1.6 m)   Wt 86.2 kg (190 lb)   SpO2 98%   Breastfeeding No   BMI 33.66 kg/m²     "

## 2022-09-19 NOTE — PLAN OF CARE
Admit from PACU. Patient continues on room air. PRN pain medication given. IVF continued. Feliciano intact. Dressing to abd intact. Patient stable at this time, no acute changes.       Problem: Adult Inpatient Plan of Care  Goal: Plan of Care Review  Outcome: Ongoing, Progressing  Goal: Patient-Specific Goal (Individualized)  Outcome: Ongoing, Progressing  Goal: Absence of Hospital-Acquired Illness or Injury  Outcome: Ongoing, Progressing  Goal: Optimal Comfort and Wellbeing  Outcome: Ongoing, Progressing  Goal: Readiness for Transition of Care  Outcome: Ongoing, Progressing     Problem: Infection  Goal: Absence of Infection Signs and Symptoms  Outcome: Ongoing, Progressing     Problem: Fall Injury Risk  Goal: Absence of Fall and Fall-Related Injury  Outcome: Ongoing, Progressing

## 2022-09-19 NOTE — NURSING TRANSFER
Nursing Transfer Note      9/19/2022     Reason patient is being transferred: postop    Transfer To: 804    Transfer via bed    Transfer with n/a    Transported by Pacu transport    Medicines sent: IVF    Any special needs or follow-up needed: n/a    Chart send with patient: Yes    Notified: spouse    Patient reassessed at: 9/19/22    Upon arrival to floor: bed in lowest position  Report given to Rosina Silva

## 2022-09-19 NOTE — ANESTHESIA PROCEDURE NOTES
Bilateral QL SS    Patient location during procedure: pre-op   Block not for primary anesthetic.  Reason for block: at surgeon's request and post-op pain management   Post-op Pain Location: abdominal wall pain   Start time: 9/19/2022 6:36 AM  Timeout: 9/19/2022 6:36 AM   End time: 9/19/2022 6:45 AM    Staffing  Authorizing Provider: Tolu Raman MD  Performing Provider: Miguel Kathleen DO    Preanesthetic Checklist  Completed: patient identified, IV checked, site marked, risks and benefits discussed, surgical consent, monitors and equipment checked, pre-op evaluation and timeout performed  Peripheral Block  Patient position: supine  Prep: ChloraPrep  Patient monitoring: cardiac monitor, heart rate, continuous pulse ox, continuous capnometry and frequent blood pressure checks  Block type: Quadratus Lumborum  Laterality: bilateral  Injection technique: single shot  Needle  Needle type: Stimuplex   Needle gauge: 21 G  Needle length: 4 in  Needle localization: ultrasound guidance   -ultrasound image captured on disc.  Assessment  Injection assessment: negative aspiration, negative parasthesia and local visualized surrounding nerve  Paresthesia pain: none  Heart rate change: no  Slow fractionated injection: yes    Medications:    Medications: bupivacaine (pf) (MARCAINE) injection 0.75% - Perineural   30 mL - 9/19/2022 6:45:00 AM    Additional Notes  VSS.  [facility] RN monitoring vitals throughout procedure.  Patient tolerated procedure well.

## 2022-09-19 NOTE — H&P
H&P  Gynecology      SUBJECTIVE:     History of Present Illness:  Priyanka Raines is a 57 y.o.  presents for WEN/BSO due to PMB/Fibroids.    Patient reports long standing history of PMB. She previously had AUB with menstrual cycles and is s/p endometrial ablation greater than 15 years ago. She noticed spotting recently for which she underwent endometrial biopsies x2 which were negative for malignancy per patient report. She was started on Provera with control of bleeding. She has known history of uterine fibroids.      Recent ED admission 22 for vaginal bleeding, pelvic pain, and fevers, likely secondary to necrotic leiomyomas.     Ob/Gyn history  ,  x1  H/o of endometrial ablation in the setting of AUB approximately 15 yo  Denies history of STDs  Receives routine Ob/Gyn care at [facility]. Last pap smear one year ago and normal per patient report  Currently sexually active with one male partner  Menopause age 54  Cousin with h/o breast cancer, no first degree relatives with history of breast/Gyn cancer       Review of patient's allergies indicates:  No Known Allergies    History reviewed. No pertinent past medical history.  History reviewed. No pertinent surgical history.  OB History    No obstetric history on file.       History reviewed. No pertinent family history.       Current Facility-Administered Medications   Medication    ceFAZolin in sterile water 2 gram/20 mL IV syringe 2,000 mg    fentaNYL 50 mcg/mL injection 100 mcg    midazolam (VERSED) 1 mg/mL injection 2 mg    mupirocin 2 % ointment    sodium chloride 0.9% flush 10 mL    sodium chloride 0.9% flush 10 mL       Review of Systems:  Constitutional: no significant weight change, fever, fatigue  Eyes:  No vision changes  Cardiovascular: No chest pain  Respiratory: No shortness of breath or cough  Gastrointestinal: No diarrhea, bloody stool, nausea/vomiting, constipation  Genitourinary: No blood in urine, painful urination, urgency of  urination, frequency of urination, +PMB  Skin/Breast: No painful breasts, nipple discharge, masses  Neurological: No headache  Endocrine: No hot flushes  Psychiatric: No depression or anxiety     OBJECTIVE:     Vital Signs  Temp:  [98.4 °F (36.9 °C)] 98.4 °F (36.9 °C)  Pulse:  [77] 77  Resp:  [16] 16  SpO2:  [99 %] 99 %  BP: (135)/(65) 135/65    Physical Exam:  Gen: A&Ox3, NAD  CV: Regular rate  Abd: Soft, non-distended, non-tender to palpation without rebound or guarding  Ext: PPP, no peripheral edema  Gu: deferred    Laboratory  No results found for this or any previous visit (from the past 96 hour(s)).    Diagnostic Results:  No results for input(s): WBC, HGB, HCT, MCV, PLT in the last 168 hours.   TVUS FINDINGS: (8/11)  Uterus:  Size: 15.6 x 9.1 x 10.1 cm  Masses: Numerous heterogenous masses of varying sizes which obscure the uterine contours.  There is a large heterogenous mass arising from the uterine fundus measuring 8.4 x 6.1 x 7.0 cm, possibly representing a pedunculated fibroid.  There are several other subserosal and intramural fibroids of varying sizes.  Nabothian cysts.  Endometrium: Patient is postmenopausal.  Unable to visualize the endometrium.  Right and left ovaries were not visualized transabdominally or transvaginally.  Small volume free fluid.     Impression:  Uterus is largely replaced by uterine fibroids of varying sizes.  There is a large heterogenous mass which appears to be arising from the uterine fundus, however cannot exclude ovarian origin.  Differential to include pedunculated uterine fibroid, ovarian dermoid or teratoma.  Can consider further evaluation with CT or MRI of the pelvis.    ASSESSMENT/PLAN:     There are no hospital problems to display for this patient.      Priyanka Raines is a 57 y.o. who presents for WEN/BSO in setting of PMB and fibroids.  -Consents signed and to chart  -All questions answered    -To OR for WEN/BSO    Jennifer Hale MD  OBGYN PGY-2

## 2022-09-20 LAB
ANION GAP SERPL CALC-SCNC: 6 MMOL/L (ref 8–16)
BASOPHILS # BLD AUTO: 0.03 K/UL (ref 0–0.2)
BASOPHILS NFR BLD: 0.3 % (ref 0–1.9)
BUN SERPL-MCNC: 7 MG/DL (ref 6–20)
CALCIUM SERPL-MCNC: 9.5 MG/DL (ref 8.7–10.5)
CHLORIDE SERPL-SCNC: 107 MMOL/L (ref 95–110)
CO2 SERPL-SCNC: 25 MMOL/L (ref 23–29)
CREAT SERPL-MCNC: 0.8 MG/DL (ref 0.5–1.4)
DIFFERENTIAL METHOD: ABNORMAL
EOSINOPHIL # BLD AUTO: 0 K/UL (ref 0–0.5)
EOSINOPHIL NFR BLD: 0.3 % (ref 0–8)
ERYTHROCYTE [DISTWIDTH] IN BLOOD BY AUTOMATED COUNT: 13.2 % (ref 11.5–14.5)
EST. GFR  (NO RACE VARIABLE): >60 ML/MIN/1.73 M^2
GLUCOSE SERPL-MCNC: 97 MG/DL (ref 70–110)
HCT VFR BLD AUTO: 34.6 % (ref 37–48.5)
HGB BLD-MCNC: 10.9 G/DL (ref 12–16)
IMM GRANULOCYTES # BLD AUTO: 0.04 K/UL (ref 0–0.04)
IMM GRANULOCYTES NFR BLD AUTO: 0.4 % (ref 0–0.5)
LYMPHOCYTES # BLD AUTO: 1.3 K/UL (ref 1–4.8)
LYMPHOCYTES NFR BLD: 11.7 % (ref 18–48)
MCH RBC QN AUTO: 26.8 PG (ref 27–31)
MCHC RBC AUTO-ENTMCNC: 31.5 G/DL (ref 32–36)
MCV RBC AUTO: 85 FL (ref 82–98)
MONOCYTES # BLD AUTO: 0.7 K/UL (ref 0.3–1)
MONOCYTES NFR BLD: 6.3 % (ref 4–15)
NEUTROPHILS # BLD AUTO: 9 K/UL (ref 1.8–7.7)
NEUTROPHILS NFR BLD: 81 % (ref 38–73)
NRBC BLD-RTO: 0 /100 WBC
PLATELET # BLD AUTO: 326 K/UL (ref 150–450)
PMV BLD AUTO: 10.9 FL (ref 9.2–12.9)
POTASSIUM SERPL-SCNC: 4.8 MMOL/L (ref 3.5–5.1)
RBC # BLD AUTO: 4.06 M/UL (ref 4–5.4)
SODIUM SERPL-SCNC: 138 MMOL/L (ref 136–145)
WBC # BLD AUTO: 11.06 K/UL (ref 3.9–12.7)

## 2022-09-20 PROCEDURE — 94799 UNLISTED PULMONARY SVC/PX: CPT

## 2022-09-20 PROCEDURE — 25000003 PHARM REV CODE 250: Performed by: STUDENT IN AN ORGANIZED HEALTH CARE EDUCATION/TRAINING PROGRAM

## 2022-09-20 PROCEDURE — 36415 COLL VENOUS BLD VENIPUNCTURE: CPT | Performed by: STUDENT IN AN ORGANIZED HEALTH CARE EDUCATION/TRAINING PROGRAM

## 2022-09-20 PROCEDURE — 20600001 HC STEP DOWN PRIVATE ROOM

## 2022-09-20 PROCEDURE — 85025 COMPLETE CBC W/AUTO DIFF WBC: CPT | Performed by: STUDENT IN AN ORGANIZED HEALTH CARE EDUCATION/TRAINING PROGRAM

## 2022-09-20 PROCEDURE — 63600175 PHARM REV CODE 636 W HCPCS: Performed by: STUDENT IN AN ORGANIZED HEALTH CARE EDUCATION/TRAINING PROGRAM

## 2022-09-20 PROCEDURE — 94761 N-INVAS EAR/PLS OXIMETRY MLT: CPT

## 2022-09-20 PROCEDURE — 80048 BASIC METABOLIC PNL TOTAL CA: CPT | Performed by: STUDENT IN AN ORGANIZED HEALTH CARE EDUCATION/TRAINING PROGRAM

## 2022-09-20 PROCEDURE — 99900035 HC TECH TIME PER 15 MIN (STAT)

## 2022-09-20 RX ORDER — FAMOTIDINE 10 MG/ML
20 INJECTION INTRAVENOUS 2 TIMES DAILY
Status: DISCONTINUED | OUTPATIENT
Start: 2022-09-20 | End: 2022-09-21 | Stop reason: HOSPADM

## 2022-09-20 RX ADMIN — MAGNESIUM HYDROXIDE 2400 MG: 400 SUSPENSION ORAL at 08:09

## 2022-09-20 RX ADMIN — ACETAMINOPHEN 1000 MG: 500 TABLET ORAL at 11:09

## 2022-09-20 RX ADMIN — OXYCODONE HYDROCHLORIDE 10 MG: 10 TABLET ORAL at 05:09

## 2022-09-20 RX ADMIN — FERROUS SULFATE TAB 325 MG (65 MG ELEMENTAL FE) 1 EACH: 325 (65 FE) TAB at 08:09

## 2022-09-20 RX ADMIN — IBUPROFEN 600 MG: 600 TABLET ORAL at 05:09

## 2022-09-20 RX ADMIN — KETOROLAC TROMETHAMINE 15 MG: 30 INJECTION, SOLUTION INTRAMUSCULAR; INTRAVENOUS at 06:09

## 2022-09-20 RX ADMIN — IBUPROFEN 600 MG: 600 TABLET ORAL at 11:09

## 2022-09-20 RX ADMIN — OXYCODONE HYDROCHLORIDE 10 MG: 10 TABLET ORAL at 02:09

## 2022-09-20 RX ADMIN — ACETAMINOPHEN 1000 MG: 500 TABLET ORAL at 05:09

## 2022-09-20 RX ADMIN — SENNOSIDES 8.6 MG: 8.6 TABLET, FILM COATED ORAL at 08:09

## 2022-09-20 RX ADMIN — FAMOTIDINE 20 MG: 10 INJECTION, SOLUTION INTRAVENOUS at 08:09

## 2022-09-20 RX ADMIN — ONDANSETRON 4 MG: 2 INJECTION INTRAMUSCULAR; INTRAVENOUS at 01:09

## 2022-09-20 NOTE — PROGRESS NOTES
Progress Note  Gynecology    Admit Date: 9/19/2022  LOS: 1    Reason for Admission:  S/P WEN (total abdominal hysterectomy)    SUBJECTIVE:     Priyanka Raines is a 57 y.o. No obstetric history on file. who is POD#1 s/p Ex-lap/WEN/BSO for the treatment of fibroids and adnexal mass (intraop frozen pathology benign).  Pt doing well this morning. Pain is well controlled. She is tolerating a regular diet without N/V. Ambulating without difficulty around her room. Patel removed this AM, has not yet voided. Has not passed  flatus, but endorses mild gas pains. She is not yet having bowel movements.    OBJECTIVE:     Vital Signs   Temp:  [97.2 °F (36.2 °C)-98.6 °F (37 °C)] 98.4 °F (36.9 °C)  Pulse:  [68-88] 81  Resp:  [14-23] 17  SpO2:  [94 %-100 %] 100 %  BP: ()/(56-76) 134/62      Intake/Output Summary (Last 24 hours) at 9/20/2022 0744  Last data filed at 9/20/2022 0500  Gross per 24 hour   Intake 3491.5 ml   Output 1675 ml   Net 1816.5 ml         Physical Exam:  Gen: A&Ox3, NAD, sitting up in chair   CV: RR  Pulm: Normal respiratory effort  Abd: active bowel sounds, soft, non-distended, minimal TTP diffusely without rebound or guarding  Inc: Midline vertical abdominal incision covered with bandage with minimal shadowing   Ext: PPP, no peripheral edema, TEDs/SCDs in place  : deferred     Laboratory:  Recent Labs   Lab 09/19/22  0551   WBC 5.98   HGB 11.0*   HCT 34.1*   MCV 82            ASSESSMENT/PLAN:     Active Hospital Problems    Diagnosis  POA    *S/P WEN/BSO [Z90.710]  No     Large adnexal mass with torsion, frozen path benign      Fibroid uterus [D25.9]  Yes      Resolved Hospital Problems   No resolved problems to display.       Assessment: 57 y.o. POD#1 s/p procedure as above    Plan:   1. Post-op   - Routine post-op advances  - Continue PRN pain medications  - D/C patel and perform spontaneous voiding trial  - Encourage ambulation   - Encourage IS  - AM CBC pending   - UOP adequate   - Continue  IVF until tolerating regular diet.  - Antiemetics prn nausea/vomiting.    2. Anemia  - Starting HH 11/34  - Asymptomatic  -   - CBC pending this AM   - Cont PO Fe    Dispo: As patient meets appropriate post-op milestones, plan for discharge to home.    Mercedez Horvath MD   PGY-3, OB-GYN

## 2022-09-20 NOTE — PLAN OF CARE
Plan of care reviewed with patient. Pt is POD # 1 of a WEN/BSO. Afebrile. Free from falls or injury. Oxy IR and dilaudid given prn for pain. LR infusing at 40. Tylenol and Toradol given as scheduled. Feliciano draining clear, yellow urine. Feliciano scheduled to come out at 6 am. Bed locked in lowest position, non skid socks on, call light within reach. Pt instructed to call if any assistance is needed. Vitals stable. Will cont to shakira pt.

## 2022-09-20 NOTE — PLAN OF CARE
Patient continues on room air. Scheduled and PRN pain medication given. Patient able to void post catheter removal. Patient passing flatus. Ambulated in hallway today. IVF d/c. Patient stable at this time, no acute changes.       Problem: Adult Inpatient Plan of Care  Goal: Plan of Care Review  Outcome: Ongoing, Progressing  Goal: Patient-Specific Goal (Individualized)  Outcome: Ongoing, Progressing  Goal: Absence of Hospital-Acquired Illness or Injury  Outcome: Ongoing, Progressing  Goal: Optimal Comfort and Wellbeing  Outcome: Ongoing, Progressing  Goal: Readiness for Transition of Care  Outcome: Ongoing, Progressing     Problem: Infection  Goal: Absence of Infection Signs and Symptoms  Outcome: Ongoing, Progressing     Problem: Fall Injury Risk  Goal: Absence of Fall and Fall-Related Injury  Outcome: Ongoing, Progressing

## 2022-09-20 NOTE — ANESTHESIA POSTPROCEDURE EVALUATION
Anesthesia Post Evaluation    Patient: Priyanka Raines    Procedure(s) Performed: Procedure(s) (LRB):  HYSTERECTOMY, TOTAL, ABDOMINAL, WITH BILATERAL SALPINGO-OOPHORECTOMY (Bilateral)  LYSIS, ADHESIONS (N/A)    Final Anesthesia Type: general      Patient location during evaluation: PACU  Patient participation: Yes- Able to Participate  Level of consciousness: awake and alert  Post-procedure vital signs: reviewed and stable  Pain management: adequate  Airway patency: patent    PONV status at discharge: No PONV  Anesthetic complications: no      Cardiovascular status: blood pressure returned to baseline  Respiratory status: unassisted  Hydration status: euvolemic  Follow-up not needed.          Vitals Value Taken Time   /62 09/20/22 0500   Temp 36.9 °C (98.4 °F) 09/20/22 0500   Pulse 81 09/20/22 0500   Resp 17 09/20/22 0548   SpO2 100 % 09/20/22 0500         Event Time   Out of Recovery 11:45:00         Pain/Chucky Score: Pain Rating Prior to Med Admin: 7 (9/20/2022  5:49 AM)  Pain Rating Post Med Admin: 0 (9/19/2022  6:55 AM)  Chucky Score: 9 (9/19/2022 12:30 PM)

## 2022-09-20 NOTE — PROGRESS NOTES
Removed pt's patel without any complications. Pt got up out of bed and was unsuccessful in using the bathroom. Currently sitting in the chair. Will cont to shakira oliver.

## 2022-09-21 ENCOUNTER — PATIENT MESSAGE (OUTPATIENT)
Dept: GYNECOLOGIC ONCOLOGY | Facility: CLINIC | Age: 57
End: 2022-09-21
Payer: COMMERCIAL

## 2022-09-21 VITALS
RESPIRATION RATE: 18 BRPM | HEIGHT: 63 IN | BODY MASS INDEX: 34.84 KG/M2 | OXYGEN SATURATION: 98 % | DIASTOLIC BLOOD PRESSURE: 62 MMHG | WEIGHT: 196.63 LBS | SYSTOLIC BLOOD PRESSURE: 118 MMHG | HEART RATE: 71 BPM | TEMPERATURE: 99 F

## 2022-09-21 PROCEDURE — 25000003 PHARM REV CODE 250: Performed by: STUDENT IN AN ORGANIZED HEALTH CARE EDUCATION/TRAINING PROGRAM

## 2022-09-21 RX ORDER — OXYCODONE HYDROCHLORIDE 5 MG/1
5 TABLET ORAL EVERY 4 HOURS PRN
Qty: 25 TABLET | Refills: 0 | Status: SHIPPED | OUTPATIENT
Start: 2022-09-21

## 2022-09-21 RX ORDER — IBUPROFEN 600 MG/1
600 TABLET ORAL EVERY 6 HOURS
Qty: 90 TABLET | Refills: 0 | Status: SHIPPED | OUTPATIENT
Start: 2022-09-21

## 2022-09-21 RX ORDER — ACETAMINOPHEN 500 MG
1000 TABLET ORAL EVERY 6 HOURS
Qty: 90 TABLET | Refills: 0 | Status: SHIPPED | OUTPATIENT
Start: 2022-09-21

## 2022-09-21 RX ADMIN — IBUPROFEN 600 MG: 600 TABLET ORAL at 11:09

## 2022-09-21 RX ADMIN — ACETAMINOPHEN 1000 MG: 500 TABLET ORAL at 06:09

## 2022-09-21 RX ADMIN — FAMOTIDINE 20 MG: 10 INJECTION, SOLUTION INTRAVENOUS at 08:09

## 2022-09-21 RX ADMIN — FERROUS SULFATE TAB 325 MG (65 MG ELEMENTAL FE) 1 EACH: 325 (65 FE) TAB at 08:09

## 2022-09-21 RX ADMIN — MAGNESIUM HYDROXIDE 2400 MG: 400 SUSPENSION ORAL at 08:09

## 2022-09-21 RX ADMIN — ACETAMINOPHEN 1000 MG: 500 TABLET ORAL at 11:09

## 2022-09-21 RX ADMIN — SENNOSIDES 8.6 MG: 8.6 TABLET, FILM COATED ORAL at 08:09

## 2022-09-21 RX ADMIN — IBUPROFEN 600 MG: 600 TABLET ORAL at 06:09

## 2022-09-21 NOTE — PLAN OF CARE
Plan of care reviewed with patient. Pt is POD # 2 of a WEN/BSO. Afebrile. Free from falls or injury. Tylenol and Ibuprofen given as scheduled. Pt up to the bathroom independently. Bed locked in lowest position, non skid socks on, call light within reach. Pt instructed to call if any assistance is needed. Vitals stable. Will cont to shakira pt.

## 2022-09-21 NOTE — DISCHARGE SUMMARY
Discharge Summary  Gynecology      Admit Date: 9/19/2022    Discharge Date and Time: 9/21/2022     Attending Physician: Melvin Coffey MD    Principal Diagnoses:   S/P WEN (total abdominal hysterectomy)    Active Hospital Problems    Diagnosis  POA    *S/P WEN/BSO [Z90.710]  No     Large adnexal mass with torsion, frozen path benign      Fibroid uterus [D25.9]  Yes      Resolved Hospital Problems   No resolved problems to display.       Procedures:   Procedure(s) (LRB):  HYSTERECTOMY, TOTAL, ABDOMINAL, WITH BILATERAL SALPINGO-OOPHORECTOMY (Bilateral)  LYSIS, ADHESIONS (N/A)    Discharged Condition: good    Hospital Course:   Priyanka Raines is a 57 y.o. y.o. No obstetric history on file. female who presented on 9/19/2022 for the above-listed procedures for the treatment of pelvic mass/endometriosis. PMH is significant for anemia. Patient tolerated the procedure well and was admitted for post-operative care. Post-operative course was uncomplicated.  On day of discharge (POD#2), patient was in stable condition, having met all post-operative milestones. She was urinating spontaneously, ambulating, and tolerating a regular diet without nausea/vomiting. Pain was well-controlled on oral medication. She was discharged with medications and follow up as listed below.     Consults: None    Significant Diagnostic Studies:  Recent Labs   Lab 09/19/22  0551 09/20/22  0603   WBC 5.98 11.06   HGB 11.0* 10.9*   HCT 34.1* 34.6*   MCV 82 85    326        Treatments:   1. Surgery as above    Disposition: Home or Self Care    Patient Instructions:   Current Discharge Medication List        START taking these medications    Details   acetaminophen (TYLENOL) 500 MG tablet Take 2 tablets (1,000 mg total) by mouth every 6 (six) hours. Alternate every 3 hours with ibuprofen. For example, if taking ibuprofen at 12, take tylenol at 3, etc.  Qty: 90 tablet, Refills: 0      ibuprofen (ADVIL,MOTRIN) 600 MG tablet Take 1 tablet (600 mg  total) by mouth every 6 (six) hours. Alternate every 3 hours with tylenol. For example, if taking ibuprofen at 3, take tylenol at 6, etc.  Qty: 90 tablet, Refills: 0           CONTINUE these medications which have CHANGED    Details   oxyCODONE (ROXICODONE) 5 MG immediate release tablet Take 1 tablet (5 mg total) by mouth every 4 (four) hours as needed for Pain.  Qty: 25 tablet, Refills: 0    Comments: Quantity prescribed more than 7 day supply? No           CONTINUE these medications which have NOT CHANGED    Details   FEROSUL 325 mg (65 mg iron) Tab tablet Take by mouth once daily.      senna-docusate 8.6-50 mg (PERICOLACE) 8.6-50 mg per tablet Take 1 tablet by mouth 2 (two) times daily.  Qty: 30 tablet, Refills: 1           STOP taking these medications       medroxyPROGESTERone (PROVERA) 10 MG tablet Comments:   Reason for Stopping:               Discharge Procedure Orders   Diet general     Lifting restrictions   Order Comments: No lifting greater than 15 pounds for six weeks.     Other restrictions (specify):   Order Comments: PELVIC REST (IF YOU HAD A HYSTERECTOMY):  No douching, tampons, or intercourse for 6 weeks.    If prescribed, vaginal estrogen cream may be used during the postoperative period.     DRIVING:  No driving while on narcotics. Driving may be resumed initially with a competent passenger one to two weeks after surgery if no longer taking narcotics.     EXERCISE:  For six weeks your exercise should be limited to walking. You may walk as far as you wish, as long as you increase your level of exertion gradually and avoid slippery surfaces. You may climb stairs as needed to get around, but should not use stair climbing for exercise.     Remove dressing in 24 hours   Order Comments: If you have a bandage on wound, you may remove it the day after dismissal.  If you had steri-strips remove them once they begin to peel off (usually 2 weeks). Keep incision clean and dry.  Inspect the incision daily for  signs and symptoms of infection.     Wound care routine (specify)   Order Comments: WOUND CARE:  If you have a band-aid or bandage on your wound, you may remove it the day after dismissal.  You may wash the wound with mild soap and water.   You may shower at any time but should avoid immersing any abdominal incisions in water for at least two weeks after surgery or until the wound is completely healed. If given, please shower with Hibiclens soap until bottle is completely finished. Keep your wound clean and dry.  You should observe your incision for signs of infection which include redness, warmth, drainage or fever.     Call MD for:  temperature >100.4     Call MD for:  persistent nausea and vomiting     Call MD for:  severe uncontrolled pain     Call MD for:  difficulty breathing, headache or visual disturbances     Call MD for:  redness, tenderness, or signs of infection (pain, swelling, redness, odor or green/yellow discharge around incision site)     Call MD for:  hives     Call MD for:   Order Comments: inability to void,urine is ketchup colored or you have large clots, vaginal bleeding is heavier than a period.    VAGINAL DISCHARGE: You may develop a vaginal discharge and intermittent vaginal spotting after surgery and up to 6 weeks postoperatively.  The discharge may have an odor and may change in color but it is normal.  This is due to dissolving stiches.  Contact your surgical team if you develop vaginal or vulvar irritation along with a discharge.  Also contact your surgical team if you have vaginal discharge that smells like urine or stool.    CONSTIPATION REMEDIES: Patients are often constipated after surgery or with use of oral narcotic medicine. You should continue to take the stool softener, Senokot-S during the next six weeks, and consume adequate amounts of water.  If you have not had a bowel movement for 3 days after dismissal, or are uncomfortable and unable to pass stool, please try one or all  of the following measures:  1.  Milk of Magnesia - 30 cc by mouth every 12 hours   2.  Dulcolax suppository - One suppository per rectum every 4-6 hours   3.  Metamucil, Fibercon or other bulk former - use as directed  4.  Fleets Enema      PAIN MEDICATIONS:     Take your pain medications as instructed. It is best to take pain medications before your pain becomes severe. This will allow you to take less medication yet have better pain relief. For the first 2 or 3 days it may be helpful to take your pain medications on a regular schedule (e.g. every 4 to 6 hours). This will help you to keep your pain under better control. You should then begin to take fewer medications each day until you no longer need them. Do not take pain medication on an empty stomach. This may lead to nausea and vomiting.     Activity as tolerated   Order Comments: PELVIC REST:  No douching, tampons, or intercourse for 6 weeks.    If prescribed, vaginal estrogen cream may be used during the postoperative period.     DRIVING:  No driving while on narcotics. Driving may be resumed initially with a competent passenger one to two weeks after surgery if no longer taking narcotics.     EXERCISE:  For six weeks your exercise should be limited to walking. You may walk as far as you wish, as long as you increase your level of exertion gradually and avoid slippery surfaces. You may climb stairs as needed to get around, but should not use stair climbing for exercise.     Shower on day dressing removed (No bath)   Order Comments: You may shower at any time but should avoid immersing any abdominal incisions in water for at least 2 weeks after surgery or until the wound is completely healed.  If given, please shower with Hibaclens soap until bottle is completely finish        Follow-up Information       Melvin Coffey MD. Schedule an appointment as soon as possible for a visit in 6 week(s).    Specialty: Gynecologic Oncology  Why: Postop Appointment  Contact  information:  [address] [address]  [phone]                             Mercedez Horvath MD   PGY-3, OB-GYN

## 2022-09-21 NOTE — PLAN OF CARE
Patient continues on room air. Scheduled pain medicine given.   Patient being discharged home. IV taken out. Discharge paperwork went over with patient. Patient will  meds from pharmacy.   Patient stable, no acute changes.     Problem: Adult Inpatient Plan of Care  Goal: Plan of Care Review  Outcome: Ongoing, Progressing  Goal: Patient-Specific Goal (Individualized)  Outcome: Ongoing, Progressing  Goal: Absence of Hospital-Acquired Illness or Injury  Outcome: Ongoing, Progressing  Goal: Optimal Comfort and Wellbeing  Outcome: Ongoing, Progressing  Goal: Readiness for Transition of Care  Outcome: Ongoing, Progressing     Problem: Infection  Goal: Absence of Infection Signs and Symptoms  Outcome: Ongoing, Progressing     Problem: Fall Injury Risk  Goal: Absence of Fall and Fall-Related Injury  Outcome: Ongoing, Progressing

## 2022-09-21 NOTE — PROGRESS NOTES
Progress Note  Gynecology    Admit Date: 9/19/2022  LOS: 2    Reason for Admission:  S/P WEN (total abdominal hysterectomy)    SUBJECTIVE:     Priyanka Raines is a 57 y.o. No obstetric history on file. who is POD#2 s/p Ex-lap/WEN/BSO for the treatment of fibroids and adnexal mass (intraop frozen pathology benign).  Pt doing well this morning. Pain is well controlled. She is tolerating a regular diet without N/V. Ambulating without difficulty around her room. Voiding spontaneously, has had BM and is passing flatus. No vaginal bleeding reported.   OBJECTIVE:     Vital Signs   Temp:  [97.9 °F (36.6 °C)-99.1 °F (37.3 °C)] 98.6 °F (37 °C)  Pulse:  [71-88] 76  Resp:  [14-20] 20  SpO2:  [94 %-99 %] 97 %  BP: (114-137)/(56-73) 129/60      Intake/Output Summary (Last 24 hours) at 9/21/2022 0721  Last data filed at 9/21/2022 0100  Gross per 24 hour   Intake 300 ml   Output 2100 ml   Net -1800 ml         Physical Exam:  Gen: A&Ox3, NAD, sitting up in chair   CV: RR  Pulm: Normal respiratory effort  Abd: active bowel sounds, soft, non-distended, minimal TTP diffusely without rebound or guarding  Inc: Midline vertical abdominal incision c/d/I with overlying steri strips.   Ext: PPP, no peripheral edema, TEDs/SCDs in place  : deferred     Laboratory:  Recent Labs   Lab 09/19/22  0551 09/20/22  0603   WBC 5.98 11.06   HGB 11.0* 10.9*   HCT 34.1* 34.6*   MCV 82 85    326         ASSESSMENT/PLAN:     Active Hospital Problems    Diagnosis  POA    *S/P WEN/BSO [Z90.710]  No     Large adnexal mass with torsion, frozen path benign      Fibroid uterus [D25.9]  Yes      Resolved Hospital Problems   No resolved problems to display.       Assessment: 57 y.o. POD#2 s/p procedure as above    Plan:   1. Post-op   - Routine post-op advances  - Continue PRN pain medications  - D/C patel and perform spontaneous voiding trial  - Encourage ambulation   - Encourage IS  - UOP adequate   - Tolerating regular diet   - Antiemetics prn  nausea/vomiting.    2. Anemia  - Starting HH 11/34> 11/34 on POD#1  - Asymptomatic  -    - Cont PO Fe    Dispo: Plan for discharge home later today as pt now meeting all postoperative milestones.     Mercedez Horvath MD   PGY-3, OB-GYN

## 2022-09-23 ENCOUNTER — TELEPHONE (OUTPATIENT)
Dept: GYNECOLOGIC ONCOLOGY | Facility: CLINIC | Age: 57
End: 2022-09-23
Payer: COMMERCIAL

## 2022-09-23 NOTE — TELEPHONE ENCOUNTER
I called Ms. Raines to check on her postop recovery and review her pathology results.  She is doing well and seems to be recovering very appropriately at this point after surgery.  We discussed her pathology results which  demonstrated an 18 cm left adnexal mass with extensive hemorrhagic infarct and inflammation consistent with torsion.  She also had fibroids and adenomyosis.  There was no evidence of malignancy.      Of note, she did say for the past two days, she has had periodic episodes where she feels like she is gasping for breath.  It occurs suddenly, she takes a deep breath, and it goes away just as suddenly.  It is better today than yesterday.  I told her that anytime someone has breathing issues after surgery, we have concern for PE - but that this did not sound characteristic of that.  Regardless, I advised her that if this increased in frequency, duration, or was associated with any chest discomfort or other symptoms, she should go to the ED for evaluation.  She expressed understanding.

## 2022-09-28 LAB
FINAL PATHOLOGIC DIAGNOSIS: NORMAL
FROZEN SECTION DIAGNOSIS: NORMAL
GROSS: NORMAL
Lab: NORMAL
SUPPLEMENTAL DIAGNOSIS: NORMAL

## 2022-10-19 ENCOUNTER — OFFICE VISIT (OUTPATIENT)
Dept: GYNECOLOGIC ONCOLOGY | Facility: CLINIC | Age: 57
End: 2022-10-19
Payer: COMMERCIAL

## 2022-10-19 VITALS
BODY MASS INDEX: 33.64 KG/M2 | HEART RATE: 71 BPM | SYSTOLIC BLOOD PRESSURE: 125 MMHG | DIASTOLIC BLOOD PRESSURE: 74 MMHG | WEIGHT: 189.88 LBS

## 2022-10-19 DIAGNOSIS — Z90.710 S/P TAH (TOTAL ABDOMINAL HYSTERECTOMY): Primary | ICD-10-CM

## 2022-10-19 PROCEDURE — 99024 PR POST-OP FOLLOW-UP VISIT: ICD-10-PCS | Mod: S$GLB,,, | Performed by: OBSTETRICS & GYNECOLOGY

## 2022-10-19 PROCEDURE — 1159F MED LIST DOCD IN RCRD: CPT | Mod: CPTII,S$GLB,, | Performed by: OBSTETRICS & GYNECOLOGY

## 2022-10-19 PROCEDURE — 99999 PR PBB SHADOW E&M-EST. PATIENT-LVL III: CPT | Mod: PBBFAC,,, | Performed by: OBSTETRICS & GYNECOLOGY

## 2022-10-19 PROCEDURE — 99999 PR PBB SHADOW E&M-EST. PATIENT-LVL III: ICD-10-PCS | Mod: PBBFAC,,, | Performed by: OBSTETRICS & GYNECOLOGY

## 2022-10-19 PROCEDURE — 3078F DIAST BP <80 MM HG: CPT | Mod: CPTII,S$GLB,, | Performed by: OBSTETRICS & GYNECOLOGY

## 2022-10-19 PROCEDURE — 3074F PR MOST RECENT SYSTOLIC BLOOD PRESSURE < 130 MM HG: ICD-10-PCS | Mod: CPTII,S$GLB,, | Performed by: OBSTETRICS & GYNECOLOGY

## 2022-10-19 PROCEDURE — 3078F PR MOST RECENT DIASTOLIC BLOOD PRESSURE < 80 MM HG: ICD-10-PCS | Mod: CPTII,S$GLB,, | Performed by: OBSTETRICS & GYNECOLOGY

## 2022-10-19 PROCEDURE — 99024 POSTOP FOLLOW-UP VISIT: CPT | Mod: S$GLB,,, | Performed by: OBSTETRICS & GYNECOLOGY

## 2022-10-19 PROCEDURE — 1159F PR MEDICATION LIST DOCUMENTED IN MEDICAL RECORD: ICD-10-PCS | Mod: CPTII,S$GLB,, | Performed by: OBSTETRICS & GYNECOLOGY

## 2022-10-19 PROCEDURE — 3074F SYST BP LT 130 MM HG: CPT | Mod: CPTII,S$GLB,, | Performed by: OBSTETRICS & GYNECOLOGY

## 2022-10-19 RX ORDER — CALCIPOTRIENE AND BETAMETHASONE DIPROPIONATE 50; .5 UG/G; MG/G
AEROSOL, FOAM TOPICAL
COMMUNITY
Start: 2022-02-08

## 2022-10-19 RX ORDER — HYDROCODONE BITARTRATE AND ACETAMINOPHEN 5; 325 MG/1; MG/1
1 TABLET ORAL EVERY 6 HOURS PRN
COMMUNITY
Start: 2022-08-08

## 2022-10-19 RX ORDER — CICLOPIROX 1 G/100ML
SHAMPOO TOPICAL
COMMUNITY
Start: 2022-02-08

## 2022-10-19 NOTE — PROGRESS NOTES
REFERRING PROVIDER  Anaid Garcia    INTERVAL HISTORY  CC: Postop Follow-up  Priyanka Raines is a 57 y.o.  s/p Ex Lap WEN BSO  on 2022 for 20 cm left ovarian mass with likely chronic torsion (details below).  She has no vaginal bleeding or discharge.  She is having no nausea or vomiting.  She has no bowel or bladder concerns.  She has no pain issues.  She is resuming her normal activities.    She is having significant hot flashes, night sweats, and insomnia since surgery.     2022 Ex Lap WEN BSO  FINDINGS:  Normal external female genitalia.  Cervix normal appearing.  Uterus was irregular with multiple fibroids.  The left ovary was cystically enlarged to 20+ cm, dark brown and adherent to the posterior uterus, abdominal peritoneum, and small bowel.  The right tube and ovary appeared normal.  Small bowel with and peritoneum with inflammatory adhesions and thickening.  Appendix normal.  FROZEN:  Benign cyst with hemorrhage and necrosis  PATHOLOGY:  Fibrovascular tissue 18.2 cm with extensive hemorrhagic infarct, inflammation, reactive changes and dystrophic calcification.  No definitive ovarian or fallopian tube tissue is identified.  No malignancy identified.  This may represent adnexal torsion.  Uterus cervix and right adnexa weighing 275 g showing inactive endometrium with extensive adenomyosis, cervix with nabothian cysts, multiple intramural leiomyomas measuring up to 3.5 cm.  Negative right ovary.  The right fallopian tube shows focal areas with chronic inflammation present in the muscular wall and serosa.  The    HPI or ONCOLOGIC HISTORY  2022 Transfer from OSH in the setting of vaginal bleeding, pelvic pain, and fevers. On arrival, patient febrile to 101.4.  On day of discharge, afebrile x48H and transitioned to PO antibiotics. Pain controlled on PO pain medications. Plan for WEN BSO with Dr Coffey.      REVIEW OF SYSTEMS  Review of Systems   Constitutional:  Negative for appetite  change, chills, fatigue, fever and unexpected weight change.   HENT:   Negative for lump/mass and mouth sores.    Respiratory:  Negative for chest tightness, cough and shortness of breath.    Cardiovascular:  Negative for chest pain, leg swelling and palpitations.   Gastrointestinal:  Negative for abdominal distention, abdominal pain, blood in stool, constipation, diarrhea, nausea and vomiting.   Genitourinary:  Negative for dysuria, frequency, vaginal bleeding and vaginal discharge.    Musculoskeletal:  Negative for arthralgias and myalgias.   Skin:  Negative for rash.   Neurological:  Negative for dizziness, light-headedness and numbness.   Hematological:  Negative for adenopathy.   Psychiatric/Behavioral:  Negative for decreased concentration, depression and sleep disturbance. The patient is not nervous/anxious.        OBJECTIVE   Vitals:    10/19/22 1017   BP: 125/74   Pulse: 71      Body mass index is 33.64 kg/m².     Physical Exam:   Constitutional: She is oriented to person, place, and time. She appears well-developed and well-nourished. No distress.    HENT:   Head: Normocephalic and atraumatic.    Eyes: Conjunctivae and EOM are normal.      Pulmonary/Chest: Effort normal. No respiratory distress.        Abdominal: Soft. She exhibits abdominal incision (well healed.  final steri-strips removed). She exhibits no distension and no mass. There is no abdominal tenderness. There is no rebound and no guarding. No hernia.     Genitourinary:    Genitourinary Comments: NEFG  Vagina - no lesions, no bleeding, cuff well healed  Cervix, Uterus, Adnexa - Surgically absent                 Neurological: She is alert and oriented to person, place, and time.    Skin: No rash noted.    Psychiatric: She has a normal mood and affect. Judgment and thought content normal.   ECOG status: 0    LABORATORY DATA  Lab data reviewed.    RADIOLOGICAL DATA  Radiology data reviewed.    PATHOLOGY DATA  Pathology data reviewed.    ASSESSMENT     1. S/P WEN/BSO    The patient is doing very well postoperatively.  Based on her benign pathology, she can follow-up with her regular healthcare team.  She will return to see me if there are concerns regarding her surgery.  She will consider short term HRT for menopausal symptoms.     PLAN  1.  Patient to follow-up with regular health care team for routine health maintenance  2.  Follow-up with me if any issues related to her recent surgery  3.  Continue pelvic rest for full 8 weeks after day of surgery          Melvin Coffey MD

## 2022-10-25 ENCOUNTER — TELEPHONE (OUTPATIENT)
Dept: GYNECOLOGIC ONCOLOGY | Facility: CLINIC | Age: 57
End: 2022-10-25
Payer: COMMERCIAL

## 2022-10-25 ENCOUNTER — PATIENT MESSAGE (OUTPATIENT)
Dept: GYNECOLOGIC ONCOLOGY | Facility: CLINIC | Age: 57
End: 2022-10-25
Payer: COMMERCIAL

## 2022-10-25 DIAGNOSIS — E89.41 HOT FLASHES DUE TO SURGICAL MENOPAUSE: Primary | ICD-10-CM

## 2022-10-25 NOTE — TELEPHONE ENCOUNTER
Spoke with pt who states she is having horrible menopausal like symptoms and during her last visit was offered a prescription for something they may help. Patient is requesting that prescription be sent to [facility] pharmacy, advised patient information will be forwarded to the doctor she voiced understanding and call was ended.

## 2022-10-25 NOTE — TELEPHONE ENCOUNTER
"----- Message from Taniya aJck sent at 10/25/2022  3:05 PM CDT -----  Consult/Advisory:           Name Of Caller: self    Contact Preference?: [phone]      What is the nature of the call?: requesting a call back in regards to hormone replacement therapy appt           Additional Notes:  "Thank you for all that you do for our patients'"     "

## 2022-10-26 NOTE — TELEPHONE ENCOUNTER
Spoke with pt and advised her that a prescription for premarin was sent to her pharmacy, pt voiced understanding and call was ended.

## 2023-04-25 ENCOUNTER — PATIENT MESSAGE (OUTPATIENT)
Dept: RESEARCH | Facility: HOSPITAL | Age: 58
End: 2023-04-25
Payer: COMMERCIAL

## 2023-05-09 ENCOUNTER — PATIENT MESSAGE (OUTPATIENT)
Dept: RESEARCH | Facility: HOSPITAL | Age: 58
End: 2023-05-09
Payer: COMMERCIAL

## 2024-09-27 NOTE — PROGRESS NOTES
Dr. Coffey is at the bedside.   Follow up with [facility], there are openings on 10/14, if anything sooner ok to add. will need to get new xrays.

## (undated) DEVICE — DRAPE UINDERBUT GRAD PCH

## (undated) DEVICE — CLOSURE SKIN STERI STRIP 1/2X4

## (undated) DEVICE — TIP YANKAUERS BULB NO VENT

## (undated) DEVICE — SEALER LIGASURE IMPACT 18CM

## (undated) DEVICE — SUT PDS II 1 TP-1 VIL

## (undated) DEVICE — TOWEL OR DISP STRL BLUE 4/PK

## (undated) DEVICE — SUT 1 36IN COATED VICRYL VI

## (undated) DEVICE — GOWN SURGICAL X-LARGE

## (undated) DEVICE — TRAY SKIN SCRUB WET PREMIUM

## (undated) DEVICE — LUBRICANT SURGILUBE 2 OZ

## (undated) DEVICE — PAD ABDOMINAL 5X9 STERILE

## (undated) DEVICE — GLOVE PROTEXIS LTX PF SURG 7.5

## (undated) DEVICE — SUTURE VICRYL 0 54

## (undated) DEVICE — DRAPE ABDOMINAL TIBURON 14X11

## (undated) DEVICE — SUT VICRYL CT-1 2-0 27IN

## (undated) DEVICE — SYR 10CC LUER LOCK

## (undated) DEVICE — DRESSING ADH ISLAND 3.6 X 14

## (undated) DEVICE — SYR 30CC LUER LOCK

## (undated) DEVICE — CLIPPER BLADE MOD 4406 (CAREF)

## (undated) DEVICE — TRAY CATH FOL SIL URIMTR 16FR

## (undated) DEVICE — APPLICATOR CHLORAPREP ORN 26ML

## (undated) DEVICE — DRESSING ABSRBNT ISLAND 3.6X8

## (undated) DEVICE — DRAPE STERI INSTRUMENT 1018

## (undated) DEVICE — DRESSING MEPORE ADH 3.5X12

## (undated) DEVICE — SEE MEDLINE ITEM 156902

## (undated) DEVICE — ELECTRODE REM PLYHSV RETURN 9

## (undated) DEVICE — SUT CTD VICRYL 0 VIL BR/CT

## (undated) DEVICE — GLOVE PROTEXIS NEOPRENE 7.5

## (undated) DEVICE — SUT MCRYL PLUS 4-0 PS2 27IN

## (undated) DEVICE — DRAPE LEGGINGS CUFF 33X51IN